# Patient Record
Sex: MALE | Race: WHITE | NOT HISPANIC OR LATINO
[De-identification: names, ages, dates, MRNs, and addresses within clinical notes are randomized per-mention and may not be internally consistent; named-entity substitution may affect disease eponyms.]

---

## 2017-08-15 ENCOUNTER — APPOINTMENT (OUTPATIENT)
Dept: INTERNAL MEDICINE | Facility: CLINIC | Age: 62
End: 2017-08-15
Payer: SELF-PAY

## 2017-08-15 VITALS
TEMPERATURE: 98.1 F | BODY MASS INDEX: 22.9 KG/M2 | RESPIRATION RATE: 14 BRPM | HEIGHT: 68 IN | WEIGHT: 151.13 LBS | DIASTOLIC BLOOD PRESSURE: 82 MMHG | SYSTOLIC BLOOD PRESSURE: 146 MMHG | OXYGEN SATURATION: 99 % | HEART RATE: 65 BPM

## 2017-08-15 PROCEDURE — 99499F: CUSTOM

## 2017-08-15 RX ORDER — TRAMADOL HYDROCHLORIDE 50 MG/1
50 TABLET, COATED ORAL
Qty: 25 | Refills: 0 | Status: DISCONTINUED | COMMUNITY
Start: 2017-05-18

## 2017-08-15 RX ORDER — OXYCODONE AND ACETAMINOPHEN 10; 325 MG/1; MG/1
10-325 TABLET ORAL
Qty: 60 | Refills: 0 | Status: DISCONTINUED | COMMUNITY
Start: 2017-06-30

## 2017-08-15 RX ORDER — OXYCODONE 10 MG/1
10 TABLET ORAL
Qty: 90 | Refills: 0 | Status: DISCONTINUED | COMMUNITY
Start: 2017-06-05

## 2017-08-15 RX ORDER — OXYCODONE AND ACETAMINOPHEN 7.5; 325 MG/1; MG/1
7.5-325 TABLET ORAL
Qty: 40 | Refills: 0 | Status: DISCONTINUED | COMMUNITY
Start: 2017-04-17

## 2017-08-15 RX ORDER — OXYCODONE 5 MG/1
5 TABLET ORAL
Qty: 110 | Refills: 0 | Status: DISCONTINUED | COMMUNITY
Start: 2017-08-08

## 2017-08-15 RX ORDER — SERTRALINE HYDROCHLORIDE 100 MG/1
100 TABLET, FILM COATED ORAL
Qty: 90 | Refills: 0 | Status: DISCONTINUED | COMMUNITY
Start: 2017-03-11

## 2017-08-15 RX ORDER — MUPIROCIN 20 MG/G
2 OINTMENT TOPICAL
Qty: 22 | Refills: 0 | Status: DISCONTINUED | COMMUNITY
Start: 2017-07-20

## 2017-08-15 RX ORDER — ALPRAZOLAM 2 MG/1
2 TABLET ORAL
Qty: 60 | Refills: 0 | Status: ACTIVE | COMMUNITY
Start: 2017-04-08

## 2017-08-15 RX ORDER — METHYLPREDNISOLONE 4 MG/1
4 TABLET ORAL
Qty: 21 | Refills: 0 | Status: DISCONTINUED | COMMUNITY
Start: 2017-04-17

## 2017-08-21 LAB
AMPHET UR-MCNC: NEGATIVE
BARBITURATES UR-MCNC: NEGATIVE
BENZODIAZ UR-MCNC: NEGATIVE
COCAINE METAB.OTHER UR-MCNC: NEGATIVE
CREATININE, URINE: 10 MG/DL
METHADONE UR-MCNC: NEGATIVE
METHAQUALONE UR-MCNC: NEGATIVE
OPIATES UR-MCNC: NEGATIVE
PCP UR-MCNC: NEGATIVE
PROPOXYPH UR QL: NEGATIVE
THC UR QL: NEGATIVE

## 2017-09-12 ENCOUNTER — APPOINTMENT (OUTPATIENT)
Dept: INTERNAL MEDICINE | Facility: CLINIC | Age: 62
End: 2017-09-12
Payer: SELF-PAY

## 2017-09-12 PROCEDURE — 99499D: CUSTOM

## 2017-10-10 ENCOUNTER — APPOINTMENT (OUTPATIENT)
Dept: INTERNAL MEDICINE | Facility: CLINIC | Age: 62
End: 2017-10-10
Payer: SELF-PAY

## 2017-10-10 PROCEDURE — 99499D: CUSTOM

## 2017-11-07 ENCOUNTER — APPOINTMENT (OUTPATIENT)
Dept: INTERNAL MEDICINE | Facility: CLINIC | Age: 62
End: 2017-11-07
Payer: SELF-PAY

## 2017-11-07 PROCEDURE — 99499D: CUSTOM

## 2017-12-05 ENCOUNTER — APPOINTMENT (OUTPATIENT)
Dept: INTERNAL MEDICINE | Facility: CLINIC | Age: 62
End: 2017-12-05
Payer: SELF-PAY

## 2017-12-05 PROCEDURE — 99499D: CUSTOM

## 2018-01-05 ENCOUNTER — APPOINTMENT (OUTPATIENT)
Dept: INTERNAL MEDICINE | Facility: CLINIC | Age: 63
End: 2018-01-05
Payer: SELF-PAY

## 2018-01-05 PROCEDURE — 99499D: CUSTOM

## 2018-02-06 ENCOUNTER — APPOINTMENT (OUTPATIENT)
Dept: INTERNAL MEDICINE | Facility: CLINIC | Age: 63
End: 2018-02-06

## 2018-02-12 ENCOUNTER — APPOINTMENT (OUTPATIENT)
Dept: INTERNAL MEDICINE | Facility: CLINIC | Age: 63
End: 2018-02-12
Payer: SELF-PAY

## 2018-02-12 PROCEDURE — 99499D: CUSTOM

## 2018-03-15 ENCOUNTER — APPOINTMENT (OUTPATIENT)
Dept: INTERNAL MEDICINE | Facility: CLINIC | Age: 63
End: 2018-03-15
Payer: SELF-PAY

## 2018-03-15 PROCEDURE — 99499D: CUSTOM

## 2018-04-12 ENCOUNTER — APPOINTMENT (OUTPATIENT)
Dept: INTERNAL MEDICINE | Facility: CLINIC | Age: 63
End: 2018-04-12
Payer: SELF-PAY

## 2018-04-12 PROCEDURE — 99499D: CUSTOM

## 2018-05-10 ENCOUNTER — APPOINTMENT (OUTPATIENT)
Dept: INTERNAL MEDICINE | Facility: CLINIC | Age: 63
End: 2018-05-10
Payer: SELF-PAY

## 2018-05-10 PROCEDURE — 99499D: CUSTOM

## 2018-06-12 ENCOUNTER — APPOINTMENT (OUTPATIENT)
Dept: INTERNAL MEDICINE | Facility: CLINIC | Age: 63
End: 2018-06-12
Payer: SELF-PAY

## 2018-06-12 PROCEDURE — 99499D: CUSTOM

## 2018-07-19 ENCOUNTER — APPOINTMENT (OUTPATIENT)
Dept: INTERNAL MEDICINE | Facility: CLINIC | Age: 63
End: 2018-07-19
Payer: SELF-PAY

## 2018-07-19 PROCEDURE — 99499D: CUSTOM

## 2018-08-17 ENCOUNTER — APPOINTMENT (OUTPATIENT)
Dept: INTERNAL MEDICINE | Facility: CLINIC | Age: 63
End: 2018-08-17
Payer: SELF-PAY

## 2018-08-17 PROCEDURE — 99499D: CUSTOM

## 2018-09-14 ENCOUNTER — APPOINTMENT (OUTPATIENT)
Dept: INTERNAL MEDICINE | Facility: CLINIC | Age: 63
End: 2018-09-14
Payer: SELF-PAY

## 2018-09-14 DIAGNOSIS — G89.29 DORSALGIA, UNSPECIFIED: ICD-10-CM

## 2018-09-14 DIAGNOSIS — M54.9 DORSALGIA, UNSPECIFIED: ICD-10-CM

## 2018-09-14 PROCEDURE — 99499D: CUSTOM

## 2018-09-14 RX ORDER — LIDOCAINE 5% 700 MG/1
5 PATCH TOPICAL
Qty: 60 | Refills: 3 | Status: ACTIVE | COMMUNITY
Start: 2018-09-14 | End: 1900-01-01

## 2018-10-19 ENCOUNTER — APPOINTMENT (OUTPATIENT)
Dept: INTERNAL MEDICINE | Facility: CLINIC | Age: 63
End: 2018-10-19
Payer: SELF-PAY

## 2018-10-19 PROCEDURE — 99499D: CUSTOM

## 2018-12-15 ENCOUNTER — APPOINTMENT (OUTPATIENT)
Dept: INTERNAL MEDICINE | Facility: CLINIC | Age: 63
End: 2018-12-15
Payer: SELF-PAY

## 2018-12-15 DIAGNOSIS — J06.9 ACUTE UPPER RESPIRATORY INFECTION, UNSPECIFIED: ICD-10-CM

## 2018-12-15 PROCEDURE — 99499D: CUSTOM

## 2018-12-15 RX ORDER — FLUTICASONE PROPIONATE 50 UG/1
50 SPRAY, METERED NASAL DAILY
Qty: 1 | Refills: 1 | Status: ACTIVE | COMMUNITY
Start: 2018-12-15 | End: 1900-01-01

## 2019-01-14 ENCOUNTER — RX RENEWAL (OUTPATIENT)
Age: 64
End: 2019-01-14

## 2019-01-15 ENCOUNTER — RX RENEWAL (OUTPATIENT)
Age: 64
End: 2019-01-15

## 2019-01-18 ENCOUNTER — APPOINTMENT (OUTPATIENT)
Dept: INTERNAL MEDICINE | Facility: CLINIC | Age: 64
End: 2019-01-18

## 2019-02-25 ENCOUNTER — APPOINTMENT (OUTPATIENT)
Dept: INTERNAL MEDICINE | Facility: CLINIC | Age: 64
End: 2019-02-25
Payer: SELF-PAY

## 2019-02-25 PROCEDURE — 99499D: CUSTOM

## 2019-02-25 RX ORDER — PROPRANOLOL HYDROCHLORIDE 10 MG/1
10 TABLET ORAL
Qty: 60 | Refills: 0 | Status: DISCONTINUED | COMMUNITY
Start: 2018-09-22

## 2019-02-25 RX ORDER — AZITHROMYCIN 250 MG/1
250 TABLET, FILM COATED ORAL
Qty: 1 | Refills: 0 | Status: DISCONTINUED | COMMUNITY
Start: 2018-12-15 | End: 2019-02-25

## 2019-03-21 ENCOUNTER — APPOINTMENT (OUTPATIENT)
Dept: INTERNAL MEDICINE | Facility: CLINIC | Age: 64
End: 2019-03-21
Payer: SELF-PAY

## 2019-03-21 PROCEDURE — 99499D: CUSTOM

## 2019-04-20 ENCOUNTER — APPOINTMENT (OUTPATIENT)
Dept: INTERNAL MEDICINE | Facility: CLINIC | Age: 64
End: 2019-04-20
Payer: SELF-PAY

## 2019-04-20 PROCEDURE — 99499D: CUSTOM

## 2019-06-03 ENCOUNTER — APPOINTMENT (OUTPATIENT)
Dept: INTERNAL MEDICINE | Facility: CLINIC | Age: 64
End: 2019-06-03
Payer: SELF-PAY

## 2019-06-03 PROCEDURE — 99499D: CUSTOM

## 2019-06-27 ENCOUNTER — APPOINTMENT (OUTPATIENT)
Dept: INTERNAL MEDICINE | Facility: CLINIC | Age: 64
End: 2019-06-27
Payer: SELF-PAY

## 2019-06-27 PROCEDURE — 99499D: CUSTOM

## 2019-08-01 ENCOUNTER — APPOINTMENT (OUTPATIENT)
Dept: INTERNAL MEDICINE | Facility: CLINIC | Age: 64
End: 2019-08-01
Payer: SELF-PAY

## 2019-08-01 PROCEDURE — 99499D: CUSTOM

## 2019-08-29 ENCOUNTER — APPOINTMENT (OUTPATIENT)
Dept: INTERNAL MEDICINE | Facility: CLINIC | Age: 64
End: 2019-08-29
Payer: SELF-PAY

## 2019-08-29 PROCEDURE — 99499D: CUSTOM

## 2019-09-27 ENCOUNTER — APPOINTMENT (OUTPATIENT)
Dept: INTERNAL MEDICINE | Facility: CLINIC | Age: 64
End: 2019-09-27
Payer: SELF-PAY

## 2019-09-27 PROCEDURE — 99499D: CUSTOM

## 2019-10-25 ENCOUNTER — APPOINTMENT (OUTPATIENT)
Dept: INTERNAL MEDICINE | Facility: CLINIC | Age: 64
End: 2019-10-25
Payer: SELF-PAY

## 2019-10-25 PROCEDURE — 99499D: CUSTOM

## 2019-11-22 ENCOUNTER — APPOINTMENT (OUTPATIENT)
Dept: INTERNAL MEDICINE | Facility: CLINIC | Age: 64
End: 2019-11-22
Payer: SELF-PAY

## 2019-11-22 ENCOUNTER — LABORATORY RESULT (OUTPATIENT)
Age: 64
End: 2019-11-22

## 2019-11-22 PROCEDURE — 99499D: CUSTOM

## 2019-12-03 LAB
AMPHET UR-MCNC: NEGATIVE
BARBITURATES UR-MCNC: NEGATIVE
BENZODIAZ UR-MCNC: NORMAL
COCAINE METAB.OTHER UR-MCNC: NEGATIVE
CREATININE, URINE: 7.8 MG/DL
METHADONE UR-MCNC: NEGATIVE
METHAQUALONE UR-MCNC: NEGATIVE
OPIATES UR-MCNC: NEGATIVE
PCP UR-MCNC: NEGATIVE
PROPOXYPH UR QL: NEGATIVE
THC UR QL: NEGATIVE

## 2020-12-16 PROBLEM — J06.9 ACUTE URI: Status: RESOLVED | Noted: 2018-12-15 | Resolved: 2020-12-16

## 2021-02-26 ENCOUNTER — APPOINTMENT (OUTPATIENT)
Dept: INTERNAL MEDICINE | Facility: CLINIC | Age: 66
End: 2021-02-26
Payer: SELF-PAY

## 2021-02-26 PROCEDURE — 99499D: CUSTOM

## 2021-03-19 ENCOUNTER — APPOINTMENT (OUTPATIENT)
Dept: INTERNAL MEDICINE | Facility: CLINIC | Age: 66
End: 2021-03-19

## 2021-03-26 ENCOUNTER — APPOINTMENT (OUTPATIENT)
Dept: INTERNAL MEDICINE | Facility: CLINIC | Age: 66
End: 2021-03-26
Payer: SELF-PAY

## 2021-03-26 PROCEDURE — 99499D: CUSTOM

## 2021-03-26 RX ORDER — BUPRENORPHINE AND NALOXONE 8; 2 MG/1; MG/1
8-2 TABLET SUBLINGUAL
Qty: 30 | Refills: 0 | Status: DISCONTINUED | COMMUNITY
Start: 2017-08-15 | End: 2021-03-26

## 2021-04-22 ENCOUNTER — APPOINTMENT (OUTPATIENT)
Dept: INTERNAL MEDICINE | Facility: CLINIC | Age: 66
End: 2021-04-22
Payer: SELF-PAY

## 2021-04-22 PROCEDURE — 99499D: CUSTOM

## 2021-05-20 ENCOUNTER — APPOINTMENT (OUTPATIENT)
Dept: INTERNAL MEDICINE | Facility: CLINIC | Age: 66
End: 2021-05-20
Payer: SELF-PAY

## 2021-05-20 PROCEDURE — 99499D: CUSTOM

## 2021-06-17 ENCOUNTER — APPOINTMENT (OUTPATIENT)
Dept: INTERNAL MEDICINE | Facility: CLINIC | Age: 66
End: 2021-06-17
Payer: SELF-PAY

## 2021-06-17 PROCEDURE — 99499D: CUSTOM

## 2021-07-08 ENCOUNTER — RX RENEWAL (OUTPATIENT)
Age: 66
End: 2021-07-08

## 2021-07-15 ENCOUNTER — APPOINTMENT (OUTPATIENT)
Dept: INTERNAL MEDICINE | Facility: CLINIC | Age: 66
End: 2021-07-15
Payer: SELF-PAY

## 2021-07-15 PROCEDURE — 99499D: CUSTOM

## 2021-08-26 ENCOUNTER — APPOINTMENT (OUTPATIENT)
Dept: INTERNAL MEDICINE | Facility: CLINIC | Age: 66
End: 2021-08-26
Payer: SELF-PAY

## 2021-08-26 ENCOUNTER — LABORATORY RESULT (OUTPATIENT)
Age: 66
End: 2021-08-26

## 2021-08-26 PROCEDURE — 99499D: CUSTOM

## 2021-09-02 LAB — SUBOXONE: NORMAL

## 2021-09-11 LAB
AMPHET UR-MCNC: NEGATIVE
BARBITURATES UR-MCNC: NEGATIVE
BENZODIAZ UR-MCNC: NORMAL
COCAINE METAB.OTHER UR-MCNC: NEGATIVE
CREATININE, URINE: 38 MG/DL
METHADONE UR-MCNC: NEGATIVE
METHAQUALONE UR-MCNC: NEGATIVE
OPIATES UR-MCNC: NEGATIVE
PCP UR-MCNC: NEGATIVE
PROPOXYPH UR QL: NEGATIVE
THC UR QL: NEGATIVE

## 2021-09-24 ENCOUNTER — APPOINTMENT (OUTPATIENT)
Dept: INTERNAL MEDICINE | Facility: CLINIC | Age: 66
End: 2021-09-24
Payer: SELF-PAY

## 2021-09-24 PROCEDURE — 99499D: CUSTOM

## 2021-10-02 ENCOUNTER — RX RENEWAL (OUTPATIENT)
Age: 66
End: 2021-10-02

## 2021-10-06 ENCOUNTER — RX RENEWAL (OUTPATIENT)
Age: 66
End: 2021-10-06

## 2021-10-22 ENCOUNTER — APPOINTMENT (OUTPATIENT)
Dept: INTERNAL MEDICINE | Facility: CLINIC | Age: 66
End: 2021-10-22
Payer: SELF-PAY

## 2021-10-22 PROCEDURE — 99499D: CUSTOM

## 2021-11-19 ENCOUNTER — APPOINTMENT (OUTPATIENT)
Dept: INTERNAL MEDICINE | Facility: CLINIC | Age: 66
End: 2021-11-19
Payer: SELF-PAY

## 2021-11-19 PROCEDURE — 99499D: CUSTOM

## 2021-12-16 ENCOUNTER — APPOINTMENT (OUTPATIENT)
Dept: INTERNAL MEDICINE | Facility: CLINIC | Age: 66
End: 2021-12-16
Payer: SELF-PAY

## 2021-12-16 PROCEDURE — 99499D: CUSTOM

## 2022-01-13 ENCOUNTER — APPOINTMENT (OUTPATIENT)
Dept: INTERNAL MEDICINE | Facility: CLINIC | Age: 67
End: 2022-01-13
Payer: SELF-PAY

## 2022-01-13 PROCEDURE — 99499D: CUSTOM

## 2022-02-10 ENCOUNTER — APPOINTMENT (OUTPATIENT)
Dept: INTERNAL MEDICINE | Facility: CLINIC | Age: 67
End: 2022-02-10
Payer: SELF-PAY

## 2022-02-10 PROCEDURE — 99499D: CUSTOM

## 2022-03-11 ENCOUNTER — APPOINTMENT (OUTPATIENT)
Dept: INTERNAL MEDICINE | Facility: CLINIC | Age: 67
End: 2022-03-11
Payer: SELF-PAY

## 2022-03-11 PROCEDURE — 99499D: CUSTOM

## 2022-04-11 ENCOUNTER — APPOINTMENT (OUTPATIENT)
Dept: INTERNAL MEDICINE | Facility: CLINIC | Age: 67
End: 2022-04-11
Payer: SELF-PAY

## 2022-04-11 PROCEDURE — 99499D: CUSTOM

## 2022-05-10 ENCOUNTER — APPOINTMENT (OUTPATIENT)
Dept: INTERNAL MEDICINE | Facility: CLINIC | Age: 67
End: 2022-05-10
Payer: SELF-PAY

## 2022-05-10 PROCEDURE — 99499D: CUSTOM

## 2022-06-07 ENCOUNTER — APPOINTMENT (OUTPATIENT)
Dept: INTERNAL MEDICINE | Facility: CLINIC | Age: 67
End: 2022-06-07
Payer: SELF-PAY

## 2022-06-07 PROCEDURE — 99499D: CUSTOM

## 2022-07-05 ENCOUNTER — APPOINTMENT (OUTPATIENT)
Dept: INTERNAL MEDICINE | Facility: CLINIC | Age: 67
End: 2022-07-05

## 2022-07-05 PROCEDURE — 99499D: CUSTOM

## 2022-07-05 RX ORDER — NALOXONE HYDROCHLORIDE 4 MG/.1ML
4 SPRAY NASAL
Qty: 1 | Refills: 0 | Status: ACTIVE | COMMUNITY
Start: 2022-07-05 | End: 1900-01-01

## 2022-08-02 ENCOUNTER — APPOINTMENT (OUTPATIENT)
Dept: INTERNAL MEDICINE | Facility: CLINIC | Age: 67
End: 2022-08-02

## 2022-08-02 PROCEDURE — 99499D: CUSTOM

## 2022-08-30 ENCOUNTER — APPOINTMENT (OUTPATIENT)
Dept: INTERNAL MEDICINE | Facility: CLINIC | Age: 67
End: 2022-08-30

## 2022-08-30 PROCEDURE — 99499D: CUSTOM

## 2022-09-29 ENCOUNTER — APPOINTMENT (OUTPATIENT)
Dept: INTERNAL MEDICINE | Facility: CLINIC | Age: 67
End: 2022-09-29

## 2022-09-29 PROCEDURE — 99499D: CUSTOM

## 2022-10-27 ENCOUNTER — APPOINTMENT (OUTPATIENT)
Dept: INTERNAL MEDICINE | Facility: CLINIC | Age: 67
End: 2022-10-27

## 2022-10-27 PROCEDURE — 99499D: CUSTOM

## 2022-11-22 ENCOUNTER — APPOINTMENT (OUTPATIENT)
Dept: INTERNAL MEDICINE | Facility: CLINIC | Age: 67
End: 2022-11-22

## 2022-11-22 PROCEDURE — 99499D: CUSTOM

## 2022-12-22 ENCOUNTER — APPOINTMENT (OUTPATIENT)
Dept: INTERNAL MEDICINE | Facility: CLINIC | Age: 67
End: 2022-12-22

## 2022-12-22 PROCEDURE — 99499D: CUSTOM

## 2023-01-20 ENCOUNTER — APPOINTMENT (OUTPATIENT)
Dept: INTERNAL MEDICINE | Facility: CLINIC | Age: 68
End: 2023-01-20
Payer: SELF-PAY

## 2023-01-20 PROCEDURE — 99499D: CUSTOM

## 2023-02-17 ENCOUNTER — APPOINTMENT (OUTPATIENT)
Dept: INTERNAL MEDICINE | Facility: CLINIC | Age: 68
End: 2023-02-17
Payer: SELF-PAY

## 2023-02-17 PROCEDURE — 99499D: CUSTOM

## 2023-03-02 ENCOUNTER — APPOINTMENT (OUTPATIENT)
Dept: SURGERY | Facility: CLINIC | Age: 68
End: 2023-03-02
Payer: MEDICARE

## 2023-03-02 VITALS
WEIGHT: 184 LBS | SYSTOLIC BLOOD PRESSURE: 172 MMHG | DIASTOLIC BLOOD PRESSURE: 79 MMHG | HEIGHT: 69 IN | BODY MASS INDEX: 27.25 KG/M2 | HEART RATE: 66 BPM | TEMPERATURE: 97.5 F

## 2023-03-02 PROCEDURE — 99204 OFFICE O/P NEW MOD 45 MIN: CPT

## 2023-03-16 NOTE — ASSESSMENT
[FreeTextEntry1] : Assessment: This is a 68 yo man with a R inguinal hernia.\par \par Plan: \par - Robot-assisted Laparoscopic Inguinal Hernia Repair, Possible Open\par All risks, benefits, alternatives were explained and the patient expressed full understanding.

## 2023-03-16 NOTE — HISTORY OF PRESENT ILLNESS
[de-identified] : This is a 66 yo man with past history of 12.5 pack year tobacco use and past surgical history of R knee surgery (2013), back surgery (2018) who presents for concerns of hernia. Pt was working on renovations and lifted a cannister of oil 4 days ago, after which he noticed a bulge in his R groin. There was no pain associated and he has never experienced this before. He is able to reduce the bulge. He has needed to strain to have a BM recently. He saw his PCP Dr. Stephenson, 2 days ago, who said this was a hernia. Pt is a retired  of 30 years, and has seen other colleagues experience a similar issue.

## 2023-03-16 NOTE — PHYSICAL EXAM
[Normal Breath Sounds] : Normal breath sounds [Normal Heart Sounds] : normal heart sounds [Abdominal Masses] : Abdominal mass present [No HSM] : no hepatosplenomegaly [No Rash or Lesion] : No rash or lesion [Alert] : alert [Oriented to Person] : oriented to person [Oriented to Place] : oriented to place [Oriented to Time] : oriented to time [Calm] : calm [de-identified] : NAD, comfortable in seated position [de-identified] : Conjunctiva and sclera clear [de-identified] : R inguinal hernia

## 2023-03-17 ENCOUNTER — APPOINTMENT (OUTPATIENT)
Dept: INTERNAL MEDICINE | Facility: CLINIC | Age: 68
End: 2023-03-17
Payer: SELF-PAY

## 2023-03-17 PROCEDURE — 99499D: CUSTOM

## 2023-04-06 ENCOUNTER — APPOINTMENT (OUTPATIENT)
Dept: SURGERY | Facility: CLINIC | Age: 68
End: 2023-04-06
Payer: MEDICARE

## 2023-04-06 VITALS
TEMPERATURE: 98.1 F | SYSTOLIC BLOOD PRESSURE: 165 MMHG | WEIGHT: 184 LBS | BODY MASS INDEX: 27.25 KG/M2 | DIASTOLIC BLOOD PRESSURE: 64 MMHG | HEART RATE: 78 BPM | HEIGHT: 69 IN | OXYGEN SATURATION: 98 %

## 2023-04-06 PROCEDURE — 99213 OFFICE O/P EST LOW 20 MIN: CPT

## 2023-04-06 NOTE — HISTORY OF PRESENT ILLNESS
[de-identified] : The patient came back for reexamination after he felt that the right groin had gotten larger.  On exam, it appears that the hernia on the right groin seem to have gotten slightly bigger.  Upon further questioning he stated that he does not have any evidence of obstructive symptoms.  He was reassured and we will proceed with scheduled hernia repair in May.  He was advised to go to the emergency room and contact the office soon as possible if at any point he started to get signs and symptoms of incarceration/strangulation.

## 2023-04-14 ENCOUNTER — APPOINTMENT (OUTPATIENT)
Dept: INTERNAL MEDICINE | Facility: CLINIC | Age: 68
End: 2023-04-14
Payer: SELF-PAY

## 2023-04-14 PROCEDURE — 99499D: CUSTOM

## 2023-05-03 ENCOUNTER — OUTPATIENT (OUTPATIENT)
Dept: OUTPATIENT SERVICES | Facility: HOSPITAL | Age: 68
LOS: 1 days | Discharge: HOME | End: 2023-05-03
Payer: MEDICARE

## 2023-05-03 VITALS
SYSTOLIC BLOOD PRESSURE: 148 MMHG | RESPIRATION RATE: 17 BRPM | DIASTOLIC BLOOD PRESSURE: 75 MMHG | HEART RATE: 64 BPM | HEIGHT: 69 IN | WEIGHT: 194.45 LBS | OXYGEN SATURATION: 97 % | TEMPERATURE: 98 F

## 2023-05-03 DIAGNOSIS — Z92.241 PERSONAL HISTORY OF SYSTEMIC STEROID THERAPY: Chronic | ICD-10-CM

## 2023-05-03 DIAGNOSIS — K40.90 UNILATERAL INGUINAL HERNIA, WITHOUT OBSTRUCTION OR GANGRENE, NOT SPECIFIED AS RECURRENT: ICD-10-CM

## 2023-05-03 DIAGNOSIS — Z01.818 ENCOUNTER FOR OTHER PREPROCEDURAL EXAMINATION: ICD-10-CM

## 2023-05-03 DIAGNOSIS — F11.20 OPIOID DEPENDENCE, UNCOMPLICATED: ICD-10-CM

## 2023-05-03 DIAGNOSIS — F41.9 ANXIETY DISORDER, UNSPECIFIED: ICD-10-CM

## 2023-05-03 DIAGNOSIS — Z96.659 PRESENCE OF UNSPECIFIED ARTIFICIAL KNEE JOINT: Chronic | ICD-10-CM

## 2023-05-03 DIAGNOSIS — Z98.890 OTHER SPECIFIED POSTPROCEDURAL STATES: Chronic | ICD-10-CM

## 2023-05-03 LAB
ANION GAP SERPL CALC-SCNC: 3 MMOL/L — LOW (ref 5–17)
BASOPHILS # BLD AUTO: 0.11 K/UL — SIGNIFICANT CHANGE UP (ref 0–0.2)
BASOPHILS NFR BLD AUTO: 1.8 % — SIGNIFICANT CHANGE UP (ref 0–2)
BUN SERPL-MCNC: 17 MG/DL — SIGNIFICANT CHANGE UP (ref 7–23)
CALCIUM SERPL-MCNC: 9.6 MG/DL — SIGNIFICANT CHANGE UP (ref 8.5–10.1)
CHLORIDE SERPL-SCNC: 103 MMOL/L — SIGNIFICANT CHANGE UP (ref 96–108)
CO2 SERPL-SCNC: 29 MMOL/L — SIGNIFICANT CHANGE UP (ref 22–31)
CREAT SERPL-MCNC: 0.84 MG/DL — SIGNIFICANT CHANGE UP (ref 0.5–1.3)
EGFR: 97 ML/MIN/1.73M2 — SIGNIFICANT CHANGE UP
EOSINOPHIL # BLD AUTO: 0.28 K/UL — SIGNIFICANT CHANGE UP (ref 0–0.5)
EOSINOPHIL NFR BLD AUTO: 4.6 % — SIGNIFICANT CHANGE UP (ref 0–6)
GLUCOSE SERPL-MCNC: 95 MG/DL — SIGNIFICANT CHANGE UP (ref 70–99)
HCT VFR BLD CALC: 34.5 % — LOW (ref 39–50)
HGB BLD-MCNC: 10.9 G/DL — LOW (ref 13–17)
IMM GRANULOCYTES NFR BLD AUTO: 1 % — HIGH (ref 0–0.9)
LYMPHOCYTES # BLD AUTO: 1.57 K/UL — SIGNIFICANT CHANGE UP (ref 1–3.3)
LYMPHOCYTES # BLD AUTO: 26 % — SIGNIFICANT CHANGE UP (ref 13–44)
MCHC RBC-ENTMCNC: 24.2 PG — LOW (ref 27–34)
MCHC RBC-ENTMCNC: 31.6 G/DL — LOW (ref 32–36)
MCV RBC AUTO: 76.5 FL — LOW (ref 80–100)
MONOCYTES # BLD AUTO: 0.84 K/UL — SIGNIFICANT CHANGE UP (ref 0–0.9)
MONOCYTES NFR BLD AUTO: 13.9 % — SIGNIFICANT CHANGE UP (ref 2–14)
NEUTROPHILS # BLD AUTO: 3.19 K/UL — SIGNIFICANT CHANGE UP (ref 1.8–7.4)
NEUTROPHILS NFR BLD AUTO: 52.7 % — SIGNIFICANT CHANGE UP (ref 43–77)
NRBC # BLD: 0 /100 WBCS — SIGNIFICANT CHANGE UP (ref 0–0)
PLATELET # BLD AUTO: 494 K/UL — HIGH (ref 150–400)
POTASSIUM SERPL-MCNC: 4 MMOL/L — SIGNIFICANT CHANGE UP (ref 3.5–5.3)
POTASSIUM SERPL-SCNC: 4 MMOL/L — SIGNIFICANT CHANGE UP (ref 3.5–5.3)
RBC # BLD: 4.51 M/UL — SIGNIFICANT CHANGE UP (ref 4.2–5.8)
RBC # FLD: 17.4 % — HIGH (ref 10.3–14.5)
SODIUM SERPL-SCNC: 135 MMOL/L — SIGNIFICANT CHANGE UP (ref 135–145)
WBC # BLD: 6.05 K/UL — SIGNIFICANT CHANGE UP (ref 3.8–10.5)
WBC # FLD AUTO: 6.05 K/UL — SIGNIFICANT CHANGE UP (ref 3.8–10.5)

## 2023-05-03 PROCEDURE — 93010 ELECTROCARDIOGRAM REPORT: CPT

## 2023-05-03 NOTE — H&P PST ADULT - PROBLEM SELECTOR PLAN 4
Preop instructions provided including NPO status. Hibiclens wash for infection control. Patient aware to stop NSAID, OTC herbals  for 7-10 days, needs to be accompanied  by adult upon discharge.  Patient verbalized understanding  medical clearance  anesthesiologist to review pst labs, ekg, medical clearances and optimization for surgery

## 2023-05-03 NOTE — H&P PST ADULT - PROBLEM/PLAN-2
Problem: Chronic Obstructive Pulmonary Disease (COPD)  Goal: *Oxygen saturation during activity within specified parameters  Outcome: Progressing Towards Goal  Goal: *Able to remain out of bed as prescribed  Outcome: Progressing Towards Goal  Goal: *Absence of hypoxia  Outcome: Progressing Towards Goal  Goal: *Optimize nutritional status  Outcome: Progressing Towards Goal     Problem: Hypertension  Goal: *Blood pressure within specified parameters  Outcome: Progressing Towards Goal  Goal: *Fluid volume balance  Outcome: Progressing Towards Goal  Goal: *Labs within defined limits  Outcome: Progressing Towards Goal     Problem: Chronic Renal Failure  Goal: *Fluid and electrolytes stabilized  Outcome: Progressing Towards Goal     Problem: Falls - Risk of  Goal: *Absence of Falls  Description: Document Darell Fall Risk and appropriate interventions in the flowsheet.   Outcome: Progressing Towards Goal  Note: Fall Risk Interventions:  Mobility Interventions: Assess mobility with egress test, Bed/chair exit alarm, Communicate number of staff needed for ambulation/transfer         Medication Interventions: Assess postural VS orthostatic hypotension, Evaluate medications/consider consulting pharmacy, Patient to call before getting OOB, Teach patient to arise slowly    Elimination Interventions: Bed/chair exit alarm, Call light in reach, Elevated toilet seat              Problem: Patient Education: Go to Patient Education Activity  Goal: Patient/Family Education  Outcome: Progressing Towards Goal     Problem: Patient Education: Go to Patient Education Activity  Goal: Patient/Family Education  Outcome: Progressing Towards Goal     Problem: Patient Education: Go to Patient Education Activity  Goal: Patient/Family Education  Outcome: Progressing Towards Goal DISPLAY PLAN FREE TEXT

## 2023-05-03 NOTE — H&P PST ADULT - NSICDXPASTSURGICALHX_GEN_ALL_CORE_FT
PAST SURGICAL HISTORY:  Previous back surgery     S/P epidural steroid injection     S/P total knee arthroplasty

## 2023-05-03 NOTE — H&P PST ADULT - HISTORY OF PRESENT ILLNESS
66 yo male , pmh- htn, anxiety , narcotic dependancy on Suboxone c/o right groin pain secondary to right inguinal hernia repair- scheduled  for robotic assist laparoscopic   right inguinal hernia  repair    denies recent travels in the past 30 days. No fever, SOB, cough, flu like symptoms or body rash- covid screen

## 2023-05-03 NOTE — H&P PST ADULT - NSANTHOSAYNRD_GEN_A_CORE
No. NICANOR screening performed.  STOP BANG Legend: 0-2 = LOW Risk; 3-4 = INTERMEDIATE Risk; 5-8 = HIGH Risk

## 2023-05-03 NOTE — H&P PST ADULT - ASSESSMENT
right inguinal hernia   CAPRINI SCORE    AGE RELATED RISK FACTORS                                                       MOBILITY RELATED FACTORS  [ ] Age 41-60 years                                            (1 Point)                  [ ] Bed rest                                                        (1 Point)  [x ] Age: 61-74 years                                           (2 Points)                [ ] Plaster cast                                                   (2 Points)  [ ] Age= 75 years                                              (3 Points)                 [ ] Bed bound for more than 72 hours                   (2 Points)    DISEASE RELATED RISK FACTORS                                               GENDER SPECIFIC FACTORS  [ ] Edema in the lower extremities                       (1 Point)                  [ ] Pregnancy                                                     (1 Point)  [ ] Varicose veins                                               (1 Point)                  [ ] Post-partum < 6 weeks                                   (1 Point)             [x ] BMI > 25 Kg/m2                                            (1 Point)                  [ ] Hormonal therapy  or oral contraception            (1 Point)                 [ ] Sepsis (in the previous month)                        (1 Point)                  [ ] History of pregnancy complications  [ ] Pneumonia or serious lung disease                                               [ ] Unexplained or recurrent                       (1 Point)           (in the previous month)                               (1 Point)  [ ] Abnormal pulmonary function test                     (1 Point)                 SURGERY RELATED RISK FACTORS  [ ] Acute myocardial infarction                              (1 Point)                 [ ]  Section                                            (1 Point)  [ ] Congestive heart failure (in the previous month)  (1 Point)                 [ ] Minor surgery                                                 (1 Point)   [ ] Inflammatory bowel disease                             (1 Point)                 [ ] Arthroscopic surgery                                        (2 Points)  [ ] Central venous access                                    (2 Points)                [x ] General surgery lasting more than 45 minutes   (2 Points)       [ ] Stroke (in the previous month)                          (5 Points)               [ ] Elective arthroplasty                                        (5 Points)                                                                                                                                               HEMATOLOGY RELATED FACTORS                                                 TRAUMA RELATED RISK FACTORS  [ ] Prior episodes of VTE                                     (3 Points)                 [ ] Fracture of the hip, pelvis, or leg                       (5 Points)  [ ] Positive family history for VTE                         (3 Points)                 [ ] Acute spinal cord injury (in the previous month)  (5 Points)  [ ] Prothrombin 36180 A                                      (3 Points)                 [ ] Paralysis  (less than 1 month)                          (5 Points)  [ ] Factor V Leiden                                             (3 Points)                 [ ] Multiple Trauma within 1 month                         (5 Points)  [ ] Lupus anticoagulants                                     (3 Points)                                                           [ ] Anticardiolipin antibodies                                (3 Points)                                                       [ ] High homocysteine in the blood                      (3 Points)                                             [ ] Other congenital or acquired thrombophilia       (3 Points)                                                [ ] Heparin induced thrombocytopenia                  (3 Points)                                          Total Score [     5     ]  Caprini Score 0-2: Low risk, No VTE Prophylaxis required for most patient's, encourage ambulation  Caprini Score 3-6: At Risk, Pharmacologic VTE prophylaxis is indicated for most patients ( in the absence of a contraindication)  Caprini Score Greater than or = 7: High Risk , pharmacologic VTE is indicated for most patients ( in the absence of a contraindication)    Caprini score indicates that the patient is high risk for VTE event ( score 6 or greater). Surgical patient's in this group will benefit from both pharmacologic prophylaxis and intermittent compression devices . Surgical team will determine the balance between VTE  risk and bleeding risk and other clinical considerations

## 2023-05-11 ENCOUNTER — APPOINTMENT (OUTPATIENT)
Dept: INTERNAL MEDICINE | Facility: CLINIC | Age: 68
End: 2023-05-11
Payer: SELF-PAY

## 2023-05-11 PROCEDURE — 99499D: CUSTOM

## 2023-05-16 ENCOUNTER — TRANSCRIPTION ENCOUNTER (OUTPATIENT)
Age: 68
End: 2023-05-16

## 2023-05-17 ENCOUNTER — TRANSCRIPTION ENCOUNTER (OUTPATIENT)
Age: 68
End: 2023-05-17

## 2023-05-17 ENCOUNTER — OUTPATIENT (OUTPATIENT)
Dept: OUTPATIENT SERVICES | Facility: HOSPITAL | Age: 68
LOS: 1 days | Discharge: HOME | End: 2023-05-17
Payer: MEDICARE

## 2023-05-17 ENCOUNTER — APPOINTMENT (OUTPATIENT)
Dept: SURGERY | Facility: HOSPITAL | Age: 68
End: 2023-05-17

## 2023-05-17 VITALS
SYSTOLIC BLOOD PRESSURE: 133 MMHG | OXYGEN SATURATION: 97 % | RESPIRATION RATE: 16 BRPM | DIASTOLIC BLOOD PRESSURE: 79 MMHG | HEART RATE: 60 BPM

## 2023-05-17 VITALS
TEMPERATURE: 98 F | DIASTOLIC BLOOD PRESSURE: 75 MMHG | SYSTOLIC BLOOD PRESSURE: 109 MMHG | OXYGEN SATURATION: 95 % | HEIGHT: 68 IN | WEIGHT: 199.96 LBS | RESPIRATION RATE: 17 BRPM | HEART RATE: 61 BPM

## 2023-05-17 DIAGNOSIS — Z98.890 OTHER SPECIFIED POSTPROCEDURAL STATES: Chronic | ICD-10-CM

## 2023-05-17 DIAGNOSIS — Z96.659 PRESENCE OF UNSPECIFIED ARTIFICIAL KNEE JOINT: Chronic | ICD-10-CM

## 2023-05-17 DIAGNOSIS — Z92.241 PERSONAL HISTORY OF SYSTEMIC STEROID THERAPY: Chronic | ICD-10-CM

## 2023-05-17 PROCEDURE — 49650 LAP ING HERNIA REPAIR INIT: CPT | Mod: AS,RT

## 2023-05-17 PROCEDURE — 49650 LAP ING HERNIA REPAIR INIT: CPT | Mod: RT

## 2023-05-17 PROCEDURE — S2900 ROBOTIC SURGICAL SYSTEM: CPT

## 2023-05-17 DEVICE — MESH HERNIA INGUINAL PROGRIP LAPAROSCOPIC 15 X 10CM RIGHT: Type: IMPLANTABLE DEVICE | Site: RIGHT | Status: FUNCTIONAL

## 2023-05-17 RX ORDER — OXYCODONE HYDROCHLORIDE 5 MG/1
1 TABLET ORAL
Qty: 6 | Refills: 0
Start: 2023-05-17

## 2023-05-17 RX ORDER — BUPRENORPHINE AND NALOXONE 2; .5 MG/1; MG/1
2 TABLET SUBLINGUAL
Refills: 0 | DISCHARGE

## 2023-05-17 RX ORDER — FENTANYL CITRATE 50 UG/ML
25 INJECTION INTRAVENOUS
Refills: 0 | Status: DISCONTINUED | OUTPATIENT
Start: 2023-05-17 | End: 2023-05-17

## 2023-05-17 RX ORDER — SODIUM CHLORIDE 9 MG/ML
1000 INJECTION, SOLUTION INTRAVENOUS
Refills: 0 | Status: DISCONTINUED | OUTPATIENT
Start: 2023-05-17 | End: 2023-05-17

## 2023-05-17 RX ORDER — LISINOPRIL 2.5 MG/1
1 TABLET ORAL
Refills: 0 | DISCHARGE

## 2023-05-17 RX ORDER — SODIUM CHLORIDE 9 MG/ML
3 INJECTION INTRAMUSCULAR; INTRAVENOUS; SUBCUTANEOUS EVERY 8 HOURS
Refills: 0 | Status: DISCONTINUED | OUTPATIENT
Start: 2023-05-17 | End: 2023-05-17

## 2023-05-17 RX ORDER — ALPRAZOLAM 0.25 MG
1 TABLET ORAL
Refills: 0 | DISCHARGE

## 2023-05-17 RX ORDER — ONDANSETRON 8 MG/1
4 TABLET, FILM COATED ORAL ONCE
Refills: 0 | Status: DISCONTINUED | OUTPATIENT
Start: 2023-05-17 | End: 2023-05-17

## 2023-05-17 RX ADMIN — SODIUM CHLORIDE 75 MILLILITER(S): 9 INJECTION, SOLUTION INTRAVENOUS at 10:17

## 2023-05-17 NOTE — ASU DISCHARGE PLAN (ADULT/PEDIATRIC) - NS MD DC FALL RISK RISK
For information on Fall & Injury Prevention, visit: [email or web address] OR  [email or web address] OR  https://www.cdc.gov/steadi/patient.html

## 2023-05-17 NOTE — ASU DISCHARGE PLAN (ADULT/PEDIATRIC) - CARE PROVIDER_API CALL
Zurdo Cohen)  Surgery  [address] [place] [address]  Phone: [phone]  Fax: [phone]  Follow Up Time: 2 weeks

## 2023-05-17 NOTE — ASU PREOP CHECKLIST - HEIGHT IN FEET
5 Glycopyrrolate Counseling:  I discussed with the patient the risks of glycopyrrolate including but not limited to skin rash, drowsiness, dry mouth, difficulty urinating, and blurred vision.

## 2023-05-17 NOTE — BRIEF OPERATIVE NOTE - NSICDXBRIEFPROCEDURE_GEN_ALL_CORE_FT
PROCEDURES:  Robot-assisted repair of right inguinal hernia using da [place] Xi 17-May-2023 09:14:46  Chepe HUFF

## 2023-05-17 NOTE — BRIEF OPERATIVE NOTE - OPERATION/FINDINGS
large right inguinal hernia in the indirect space. Mesh placed in the myopectineal orifice. hernia sac reduced

## 2023-05-17 NOTE — ASU PATIENT PROFILE, ADULT - FALL HARM RISK - RISK INTERVENTIONS
Blepharitis instruction sheet given. Communicate Fall Risk and Risk Factors to all staff, patient, and family/Reinforce activity limits and safety measures with patient and family/Visual Cue: Yellow wristband/Bed in lowest position, wheels locked, appropriate side rails in place/Call bell, personal items and telephone in reach/Instruct patient to call for assistance before getting out of bed or chair/Non-slip footwear when patient is out of bed/[place] to call system/Physically safe environment - no spills, clutter or unnecessary equipment/Purposeful Proactive Rounding/Room/bathroom lighting operational, light cord in reach

## 2023-05-18 RX ORDER — OXYCODONE HYDROCHLORIDE 5 MG/1
1 TABLET ORAL
Qty: 12 | Refills: 0
Start: 2023-05-18 | End: 2023-05-20

## 2023-05-18 RX ORDER — IBUPROFEN 200 MG
1 TABLET ORAL
Qty: 16 | Refills: 0
Start: 2023-05-18 | End: 2023-05-21

## 2023-05-22 DIAGNOSIS — I10 ESSENTIAL (PRIMARY) HYPERTENSION: ICD-10-CM

## 2023-05-22 DIAGNOSIS — F11.20 OPIOID DEPENDENCE, UNCOMPLICATED: ICD-10-CM

## 2023-05-22 DIAGNOSIS — F41.9 ANXIETY DISORDER, UNSPECIFIED: ICD-10-CM

## 2023-05-22 DIAGNOSIS — Z96.659 PRESENCE OF UNSPECIFIED ARTIFICIAL KNEE JOINT: ICD-10-CM

## 2023-05-22 DIAGNOSIS — K40.90 UNILATERAL INGUINAL HERNIA, WITHOUT OBSTRUCTION OR GANGRENE, NOT SPECIFIED AS RECURRENT: ICD-10-CM

## 2023-05-23 PROBLEM — F11.20 OPIOID DEPENDENCE, UNCOMPLICATED: Chronic | Status: ACTIVE | Noted: 2023-05-03

## 2023-05-23 PROBLEM — I10 ESSENTIAL (PRIMARY) HYPERTENSION: Chronic | Status: ACTIVE | Noted: 2023-05-03

## 2023-05-23 PROBLEM — F41.9 ANXIETY DISORDER, UNSPECIFIED: Chronic | Status: ACTIVE | Noted: 2023-05-03

## 2023-05-25 ENCOUNTER — APPOINTMENT (OUTPATIENT)
Dept: SURGERY | Facility: CLINIC | Age: 68
End: 2023-05-25
Payer: MEDICARE

## 2023-05-25 VITALS
SYSTOLIC BLOOD PRESSURE: 147 MMHG | OXYGEN SATURATION: 96 % | HEART RATE: 72 BPM | WEIGHT: 184 LBS | TEMPERATURE: 98.4 F | DIASTOLIC BLOOD PRESSURE: 72 MMHG | BODY MASS INDEX: 27.25 KG/M2 | HEIGHT: 69 IN

## 2023-05-25 DIAGNOSIS — K40.90 UNILATERAL INGUINAL HERNIA, W/OUT OBSTRUCTION OR GANGRENE, NOT SPECIFIED AS RECURRENT: ICD-10-CM

## 2023-05-25 PROCEDURE — 99024 POSTOP FOLLOW-UP VISIT: CPT

## 2023-05-25 NOTE — HISTORY OF PRESENT ILLNESS
[de-identified] : The patient was seen and examined. Pt is s/p robotic assisted right inguinal hernia. On exam, the patient has small cord swelling.  Pt was reassured.  Return to office as needed.

## 2023-06-08 ENCOUNTER — APPOINTMENT (OUTPATIENT)
Dept: INTERNAL MEDICINE | Facility: CLINIC | Age: 68
End: 2023-06-08
Payer: SELF-PAY

## 2023-06-08 PROCEDURE — 99499D: CUSTOM

## 2023-07-06 ENCOUNTER — APPOINTMENT (OUTPATIENT)
Dept: INTERNAL MEDICINE | Facility: CLINIC | Age: 68
End: 2023-07-06
Payer: SELF-PAY

## 2023-07-06 PROCEDURE — 99499D: CUSTOM

## 2023-08-03 ENCOUNTER — APPOINTMENT (OUTPATIENT)
Dept: INTERNAL MEDICINE | Facility: CLINIC | Age: 68
End: 2023-08-03
Payer: SELF-PAY

## 2023-08-03 PROCEDURE — 99499D: CUSTOM

## 2023-08-31 ENCOUNTER — APPOINTMENT (OUTPATIENT)
Dept: INTERNAL MEDICINE | Facility: CLINIC | Age: 68
End: 2023-08-31
Payer: SELF-PAY

## 2023-08-31 PROCEDURE — 99499D: CUSTOM

## 2023-09-28 ENCOUNTER — TRANSCRIPTION ENCOUNTER (OUTPATIENT)
Age: 68
End: 2023-09-28

## 2023-09-28 ENCOUNTER — APPOINTMENT (OUTPATIENT)
Dept: INTERNAL MEDICINE | Facility: CLINIC | Age: 68
End: 2023-09-28
Payer: SELF-PAY

## 2023-09-28 PROCEDURE — 99499D: CUSTOM

## 2023-10-27 ENCOUNTER — APPOINTMENT (OUTPATIENT)
Dept: INTERNAL MEDICINE | Facility: CLINIC | Age: 68
End: 2023-10-27
Payer: SELF-PAY

## 2023-10-27 PROCEDURE — 99499D: CUSTOM

## 2023-11-21 ENCOUNTER — APPOINTMENT (OUTPATIENT)
Dept: INTERNAL MEDICINE | Facility: CLINIC | Age: 68
End: 2023-11-21
Payer: SELF-PAY

## 2023-11-21 PROCEDURE — 99499D: CUSTOM

## 2023-12-21 ENCOUNTER — APPOINTMENT (OUTPATIENT)
Dept: INTERNAL MEDICINE | Facility: CLINIC | Age: 68
End: 2023-12-21
Payer: SELF-PAY

## 2023-12-21 PROCEDURE — 99499D: CUSTOM

## 2024-01-04 NOTE — ASU DISCHARGE PLAN (ADULT/PEDIATRIC) - B. DRINK ALCOHOL, BEER, OR WINE
I called and spoke with patient he voiced that [facility]. I advised patient that I would [facility]. He voiced that he was given samples of Nurtec to use PRN. He voiced that he would like to let  know that this medication is working well from him but that he is out of samples now. He would like to know if [facility]UAN would send in script for Nurtec to the [facility].    Statement Selected

## 2024-01-18 ENCOUNTER — APPOINTMENT (OUTPATIENT)
Dept: INTERNAL MEDICINE | Facility: CLINIC | Age: 69
End: 2024-01-18
Payer: SELF-PAY

## 2024-01-18 PROCEDURE — 99499D: CUSTOM

## 2024-02-15 ENCOUNTER — APPOINTMENT (OUTPATIENT)
Dept: INTERNAL MEDICINE | Facility: CLINIC | Age: 69
End: 2024-02-15
Payer: SELF-PAY

## 2024-02-15 PROCEDURE — 99499D: CUSTOM

## 2024-03-15 ENCOUNTER — APPOINTMENT (OUTPATIENT)
Dept: INTERNAL MEDICINE | Facility: CLINIC | Age: 69
End: 2024-03-15
Payer: SELF-PAY

## 2024-03-15 PROCEDURE — 99499D: CUSTOM

## 2024-04-12 ENCOUNTER — APPOINTMENT (OUTPATIENT)
Dept: INTERNAL MEDICINE | Facility: CLINIC | Age: 69
End: 2024-04-12
Payer: SELF-PAY

## 2024-04-12 PROCEDURE — 99499D: CUSTOM

## 2024-05-10 ENCOUNTER — APPOINTMENT (OUTPATIENT)
Dept: INTERNAL MEDICINE | Facility: CLINIC | Age: 69
End: 2024-05-10
Payer: SELF-PAY

## 2024-05-10 PROCEDURE — 99499D: CUSTOM

## 2024-06-07 ENCOUNTER — APPOINTMENT (OUTPATIENT)
Dept: INTERNAL MEDICINE | Facility: CLINIC | Age: 69
End: 2024-06-07
Payer: SELF-PAY

## 2024-06-07 DIAGNOSIS — F11.20 OPIOID DEPENDENCE, UNCOMPLICATED: ICD-10-CM

## 2024-06-07 PROCEDURE — 99499D: CUSTOM

## 2024-06-07 RX ORDER — BUPRENORPHINE AND NALOXONE 8; 2 MG/1; MG/1
8-2 FILM BUCCAL; SUBLINGUAL
Qty: 60 | Refills: 0 | Status: ACTIVE | COMMUNITY
Start: 2022-10-27 | End: 1900-01-01

## 2024-07-05 ENCOUNTER — APPOINTMENT (OUTPATIENT)
Dept: INTERNAL MEDICINE | Facility: CLINIC | Age: 69
End: 2024-07-05
Payer: SELF-PAY

## 2024-07-05 PROCEDURE — 99499D: CUSTOM

## 2024-08-01 ENCOUNTER — APPOINTMENT (OUTPATIENT)
Dept: INTERNAL MEDICINE | Facility: CLINIC | Age: 69
End: 2024-08-01
Payer: SELF-PAY

## 2024-08-01 PROCEDURE — 99499D: CUSTOM

## 2024-08-30 ENCOUNTER — APPOINTMENT (OUTPATIENT)
Dept: INTERNAL MEDICINE | Facility: CLINIC | Age: 69
End: 2024-08-30
Payer: SELF-PAY

## 2024-08-30 PROCEDURE — 99499D: CUSTOM

## 2024-09-27 ENCOUNTER — APPOINTMENT (OUTPATIENT)
Dept: INTERNAL MEDICINE | Facility: CLINIC | Age: 69
End: 2024-09-27
Payer: SELF-PAY

## 2024-09-27 PROCEDURE — 99499D: CUSTOM

## 2024-10-24 ENCOUNTER — APPOINTMENT (OUTPATIENT)
Dept: INTERNAL MEDICINE | Facility: CLINIC | Age: 69
End: 2024-10-24
Payer: SELF-PAY

## 2024-10-24 PROCEDURE — 99499D: CUSTOM

## 2024-11-19 ENCOUNTER — APPOINTMENT (OUTPATIENT)
Dept: INTERNAL MEDICINE | Facility: CLINIC | Age: 69
End: 2024-11-19
Payer: SELF-PAY

## 2024-11-19 PROCEDURE — 99499D: CUSTOM

## 2024-11-24 ENCOUNTER — INPATIENT (INPATIENT)
Facility: HOSPITAL | Age: 69
LOS: 10 days | Discharge: HOME | End: 2024-12-05
Attending: HOSPITALIST | Admitting: HOSPITALIST
Payer: MEDICARE

## 2024-11-24 VITALS
HEART RATE: 86 BPM | WEIGHT: 197.98 LBS | TEMPERATURE: 99 F | SYSTOLIC BLOOD PRESSURE: 119 MMHG | HEIGHT: 68 IN | DIASTOLIC BLOOD PRESSURE: 76 MMHG | OXYGEN SATURATION: 95 % | RESPIRATION RATE: 17 BRPM

## 2024-11-24 DIAGNOSIS — F42.9 OBSESSIVE-COMPULSIVE DISORDER, UNSPECIFIED: ICD-10-CM

## 2024-11-24 DIAGNOSIS — F11.20 OPIOID DEPENDENCE, UNCOMPLICATED: ICD-10-CM

## 2024-11-24 DIAGNOSIS — F41.9 ANXIETY DISORDER, UNSPECIFIED: ICD-10-CM

## 2024-11-24 DIAGNOSIS — Z98.890 OTHER SPECIFIED POSTPROCEDURAL STATES: Chronic | ICD-10-CM

## 2024-11-24 DIAGNOSIS — Z96.659 PRESENCE OF UNSPECIFIED ARTIFICIAL KNEE JOINT: Chronic | ICD-10-CM

## 2024-11-24 DIAGNOSIS — Z92.241 PERSONAL HISTORY OF SYSTEMIC STEROID THERAPY: Chronic | ICD-10-CM

## 2024-11-24 DIAGNOSIS — M79.89 OTHER SPECIFIED SOFT TISSUE DISORDERS: ICD-10-CM

## 2024-11-24 DIAGNOSIS — F41.0 PANIC DISORDER [EPISODIC PAROXYSMAL ANXIETY]: ICD-10-CM

## 2024-11-24 DIAGNOSIS — D64.9 ANEMIA, UNSPECIFIED: ICD-10-CM

## 2024-11-24 DIAGNOSIS — I10 ESSENTIAL (PRIMARY) HYPERTENSION: ICD-10-CM

## 2024-11-24 DIAGNOSIS — Z29.9 ENCOUNTER FOR PROPHYLACTIC MEASURES, UNSPECIFIED: ICD-10-CM

## 2024-11-24 LAB
ALBUMIN SERPL ELPH-MCNC: 3.9 G/DL — SIGNIFICANT CHANGE UP (ref 3.3–5)
ALP SERPL-CCNC: 97 U/L — SIGNIFICANT CHANGE UP (ref 40–120)
ALT FLD-CCNC: 16 U/L — SIGNIFICANT CHANGE UP (ref 12–78)
ANION GAP SERPL CALC-SCNC: 6 MMOL/L — SIGNIFICANT CHANGE UP (ref 5–17)
ANISOCYTOSIS BLD QL: SIGNIFICANT CHANGE UP
APPEARANCE UR: CLEAR — SIGNIFICANT CHANGE UP
APTT BLD: 31.4 SEC — SIGNIFICANT CHANGE UP (ref 24.5–35.6)
AST SERPL-CCNC: 27 U/L — SIGNIFICANT CHANGE UP (ref 15–37)
BACTERIA # UR AUTO: ABNORMAL /HPF
BASOPHILS # BLD AUTO: 0.36 K/UL — HIGH (ref 0–0.2)
BASOPHILS NFR BLD AUTO: 2.1 % — HIGH (ref 0–2)
BILIRUB SERPL-MCNC: 1.2 MG/DL — SIGNIFICANT CHANGE UP (ref 0.2–1.2)
BILIRUB UR-MCNC: NEGATIVE — SIGNIFICANT CHANGE UP
BUN SERPL-MCNC: 26 MG/DL — HIGH (ref 7–23)
CALCIUM SERPL-MCNC: 9.1 MG/DL — SIGNIFICANT CHANGE UP (ref 8.5–10.1)
CHLORIDE SERPL-SCNC: 99 MMOL/L — SIGNIFICANT CHANGE UP (ref 96–108)
CO2 SERPL-SCNC: 29 MMOL/L — SIGNIFICANT CHANGE UP (ref 22–31)
COLOR SPEC: YELLOW — SIGNIFICANT CHANGE UP
CREAT SERPL-MCNC: 1.2 MG/DL — SIGNIFICANT CHANGE UP (ref 0.5–1.3)
CRP SERPL-MCNC: 47 MG/L — HIGH
DIFF PNL FLD: NEGATIVE — SIGNIFICANT CHANGE UP
EGFR: 66 ML/MIN/1.73M2 — SIGNIFICANT CHANGE UP
EOSINOPHIL # BLD AUTO: 0.2 K/UL — SIGNIFICANT CHANGE UP (ref 0–0.5)
EOSINOPHIL NFR BLD AUTO: 1.2 % — SIGNIFICANT CHANGE UP (ref 0–6)
ERYTHROCYTE [SEDIMENTATION RATE] IN BLOOD: 29 MM/HR — HIGH (ref 0–20)
GLUCOSE SERPL-MCNC: 112 MG/DL — HIGH (ref 70–99)
GLUCOSE UR QL: NEGATIVE MG/DL — SIGNIFICANT CHANGE UP
HCT VFR BLD CALC: 30.6 % — LOW (ref 39–50)
HGB BLD-MCNC: 9.5 G/DL — LOW (ref 13–17)
IMM GRANULOCYTES NFR BLD AUTO: 0.6 % — SIGNIFICANT CHANGE UP (ref 0–0.9)
INR BLD: 1.43 RATIO — HIGH (ref 0.85–1.16)
KETONES UR-MCNC: NEGATIVE MG/DL — SIGNIFICANT CHANGE UP
LACTATE SERPL-SCNC: 1.3 MMOL/L — SIGNIFICANT CHANGE UP (ref 0.7–2)
LEUKOCYTE ESTERASE UR-ACNC: NEGATIVE — SIGNIFICANT CHANGE UP
LYMPHOCYTES # BLD AUTO: 1.12 K/UL — SIGNIFICANT CHANGE UP (ref 1–3.3)
LYMPHOCYTES # BLD AUTO: 6.5 % — LOW (ref 13–44)
MAGNESIUM SERPL-MCNC: 1.8 MG/DL — SIGNIFICANT CHANGE UP (ref 1.6–2.6)
MANUAL SMEAR VERIFICATION: SIGNIFICANT CHANGE UP
MCHC RBC-ENTMCNC: 23.8 PG — LOW (ref 27–34)
MCHC RBC-ENTMCNC: 31 G/DL — LOW (ref 32–36)
MCV RBC AUTO: 76.7 FL — LOW (ref 80–100)
MONOCYTES # BLD AUTO: 1.32 K/UL — HIGH (ref 0–0.9)
MONOCYTES NFR BLD AUTO: 7.7 % — SIGNIFICANT CHANGE UP (ref 2–14)
NEUTROPHILS # BLD AUTO: 14.04 K/UL — HIGH (ref 1.8–7.4)
NEUTROPHILS NFR BLD AUTO: 81.9 % — HIGH (ref 43–77)
NITRITE UR-MCNC: NEGATIVE — SIGNIFICANT CHANGE UP
NRBC # BLD: 0 /100 WBCS — SIGNIFICANT CHANGE UP (ref 0–0)
OVALOCYTES BLD QL SMEAR: SLIGHT — SIGNIFICANT CHANGE UP
PH UR: 6 — SIGNIFICANT CHANGE UP (ref 5–8)
PLAT MORPH BLD: NORMAL — SIGNIFICANT CHANGE UP
PLATELET # BLD AUTO: 385 K/UL — SIGNIFICANT CHANGE UP (ref 150–400)
POIKILOCYTOSIS BLD QL AUTO: SLIGHT — SIGNIFICANT CHANGE UP
POLYCHROMASIA BLD QL SMEAR: SLIGHT — SIGNIFICANT CHANGE UP
POTASSIUM SERPL-MCNC: 4.4 MMOL/L — SIGNIFICANT CHANGE UP (ref 3.5–5.3)
POTASSIUM SERPL-SCNC: 4.4 MMOL/L — SIGNIFICANT CHANGE UP (ref 3.5–5.3)
PROT SERPL-MCNC: 7.5 GM/DL — SIGNIFICANT CHANGE UP (ref 6–8.3)
PROT UR-MCNC: NEGATIVE MG/DL — SIGNIFICANT CHANGE UP
PROTHROM AB SERPL-ACNC: 16.1 SEC — HIGH (ref 9.9–13.4)
RBC # BLD: 3.99 M/UL — LOW (ref 4.2–5.8)
RBC # FLD: 21.6 % — HIGH (ref 10.3–14.5)
RBC BLD AUTO: ABNORMAL
RBC CASTS # UR COMP ASSIST: 0 /HPF — SIGNIFICANT CHANGE UP (ref 0–4)
SODIUM SERPL-SCNC: 134 MMOL/L — LOW (ref 135–145)
SP GR SPEC: 1.01 — SIGNIFICANT CHANGE UP (ref 1–1.03)
UROBILINOGEN FLD QL: 0.2 MG/DL — SIGNIFICANT CHANGE UP (ref 0.2–1)
WBC # BLD: 17.14 K/UL — HIGH (ref 3.8–10.5)
WBC # FLD AUTO: 17.14 K/UL — HIGH (ref 3.8–10.5)
WBC UR QL: 1 /HPF — SIGNIFICANT CHANGE UP (ref 0–5)

## 2024-11-24 PROCEDURE — 99285 EMERGENCY DEPT VISIT HI MDM: CPT

## 2024-11-24 PROCEDURE — 99222 1ST HOSP IP/OBS MODERATE 55: CPT

## 2024-11-24 PROCEDURE — 93970 EXTREMITY STUDY: CPT | Mod: 26

## 2024-11-24 RX ORDER — SERTRALINE HYDROCHLORIDE 100 MG/1
0 TABLET, FILM COATED ORAL
Qty: 0 | Refills: 2 | DISCHARGE

## 2024-11-24 RX ORDER — BUPRENORPHINE AND NALOXONE 8; 2 MG/1; MG/1
1 TABLET SUBLINGUAL ONCE
Refills: 0 | Status: DISCONTINUED | OUTPATIENT
Start: 2024-11-24 | End: 2024-11-24

## 2024-11-24 RX ORDER — MAGNESIUM, ALUMINUM HYDROXIDE 200-225/5
30 SUSPENSION, ORAL (FINAL DOSE FORM) ORAL EVERY 4 HOURS
Refills: 0 | Status: DISCONTINUED | OUTPATIENT
Start: 2024-11-24 | End: 2024-11-26

## 2024-11-24 RX ORDER — PIPERACILLIN SODIUM AND TAZOBACTAM SODIUM 4; .5 G/20ML; G/20ML
3.38 INJECTION, POWDER, LYOPHILIZED, FOR SOLUTION INTRAVENOUS ONCE
Refills: 0 | Status: COMPLETED | OUTPATIENT
Start: 2024-11-24 | End: 2024-11-24

## 2024-11-24 RX ORDER — ALPRAZOLAM 0.5 MG
2 TABLET ORAL
Refills: 0 | Status: DISCONTINUED | OUTPATIENT
Start: 2024-11-25 | End: 2024-12-02

## 2024-11-24 RX ORDER — ALPRAZOLAM 0.5 MG
2 TABLET ORAL
Refills: 0 | Status: DISCONTINUED | OUTPATIENT
Start: 2024-11-24 | End: 2024-11-24

## 2024-11-24 RX ORDER — ACETAMINOPHEN 500MG 500 MG/1
650 TABLET, COATED ORAL EVERY 6 HOURS
Refills: 0 | Status: DISCONTINUED | OUTPATIENT
Start: 2024-11-24 | End: 2024-12-05

## 2024-11-24 RX ORDER — ALPRAZOLAM 0.5 MG
2 TABLET ORAL ONCE
Refills: 0 | Status: DISCONTINUED | OUTPATIENT
Start: 2024-11-24 | End: 2024-11-24

## 2024-11-24 RX ORDER — FUROSEMIDE 40 MG/1
20 TABLET ORAL DAILY
Refills: 0 | Status: DISCONTINUED | OUTPATIENT
Start: 2024-11-25 | End: 2024-11-26

## 2024-11-24 RX ORDER — VANCOMYCIN HCL 900 MCG/MG
1000 POWDER (GRAM) MISCELLANEOUS ONCE
Refills: 0 | Status: COMPLETED | OUTPATIENT
Start: 2024-11-24 | End: 2024-11-24

## 2024-11-24 RX ORDER — LISINOPRIL 20 MG/1
5 TABLET ORAL DAILY
Refills: 0 | Status: DISCONTINUED | OUTPATIENT
Start: 2024-11-24 | End: 2024-11-26

## 2024-11-24 RX ORDER — ZOLPIDEM TARTRATE 10 MG/1
0 TABLET ORAL
Qty: 0 | Refills: 1 | DISCHARGE

## 2024-11-24 RX ORDER — ACETAMINOPHEN, DIPHENHYDRAMINE HCL, PHENYLEPHRINE HCL 325; 25; 5 MG/1; MG/1; MG/1
3 TABLET ORAL AT BEDTIME
Refills: 0 | Status: DISCONTINUED | OUTPATIENT
Start: 2024-11-24 | End: 2024-11-27

## 2024-11-24 RX ORDER — ALPRAZOLAM 0.5 MG
1 TABLET ORAL ONCE
Refills: 0 | Status: DISCONTINUED | OUTPATIENT
Start: 2024-11-24 | End: 2024-11-24

## 2024-11-24 RX ORDER — BUPRENORPHINE AND NALOXONE 8; 2 MG/1; MG/1
1 TABLET SUBLINGUAL
Refills: 0 | Status: DISCONTINUED | OUTPATIENT
Start: 2024-11-24 | End: 2024-12-01

## 2024-11-24 RX ORDER — CEFAZOLIN SODIUM 10 G
1000 VIAL (EA) INJECTION EVERY 8 HOURS
Refills: 0 | Status: DISCONTINUED | OUTPATIENT
Start: 2024-11-24 | End: 2024-11-26

## 2024-11-24 RX ORDER — ACETAMINOPHEN 500MG 500 MG/1
1000 TABLET, COATED ORAL ONCE
Refills: 0 | Status: COMPLETED | OUTPATIENT
Start: 2024-11-24 | End: 2024-11-24

## 2024-11-24 RX ORDER — 0.9 % SODIUM CHLORIDE 0.9 %
1000 INTRAVENOUS SOLUTION INTRAVENOUS ONCE
Refills: 0 | Status: COMPLETED | OUTPATIENT
Start: 2024-11-24 | End: 2024-11-24

## 2024-11-24 RX ADMIN — BUPRENORPHINE AND NALOXONE 1 FILM(S): 8; 2 TABLET SUBLINGUAL at 23:14

## 2024-11-24 RX ADMIN — Medication 100 MILLIGRAM(S): at 23:12

## 2024-11-24 RX ADMIN — Medication 250 MILLIGRAM(S): at 20:03

## 2024-11-24 RX ADMIN — PIPERACILLIN SODIUM AND TAZOBACTAM SODIUM 200 GRAM(S): 4; .5 INJECTION, POWDER, LYOPHILIZED, FOR SOLUTION INTRAVENOUS at 19:34

## 2024-11-24 RX ADMIN — Medication 2 MILLIGRAM(S): at 23:22

## 2024-11-24 RX ADMIN — Medication 2 MILLIGRAM(S): at 18:56

## 2024-11-24 RX ADMIN — PIPERACILLIN SODIUM AND TAZOBACTAM SODIUM 3.38 GRAM(S): 4; .5 INJECTION, POWDER, LYOPHILIZED, FOR SOLUTION INTRAVENOUS at 20:04

## 2024-11-24 RX ADMIN — Medication 1000 MILLILITER(S): at 21:16

## 2024-11-24 RX ADMIN — ACETAMINOPHEN 500MG 400 MILLIGRAM(S): 500 TABLET, COATED ORAL at 15:24

## 2024-11-24 NOTE — H&P ADULT - PROBLEM SELECTOR PLAN 2
- Home med Suboxone (due to oxycodone use from back surgery, expected to finish long suboxone taper in 1 year)  - C/w Suboxone

## 2024-11-24 NOTE — H&P ADULT - PROBLEM SELECTOR PLAN 6
- C/w home lisinopril 5mg - Microcytic anemia  - P/w Hgb 9.5 (it was 10.9 in May 2023)  - Possibly iron deficiency anemia  - No sign of bleeding  - Outpt follow up

## 2024-11-24 NOTE — H&P ADULT - PROBLEM SELECTOR PLAN 3
- Home med Xanax 2mg TID. Patient says he gets very bad panic attacks so he will not taper off of Xanax  - C/w home med

## 2024-11-24 NOTE — H&P ADULT - NSHPPHYSICALEXAM_GEN_ALL_CORE
GENERAL: NAD  HEAD: Atraumatic, Normocephalic  EYES: EOMI. Conjunctiva and sclera clear  NECK: Supple  CHEST/LUNG: Clear to auscultation bilaterally; No wheeze, rhonchi, rales  HEART: Regular rate and rhythm; No murmurs  ABDOMEN: Soft, Nontender, Nondistended; Bowel sounds present  EXTREMITIES: BL pitting LE +2 edema; With tenderness, warmth, and erythema below the knee  NEURO: AAOx3, non-focal  SKIN: Past surgical scar on L knee without any erythema or swelling

## 2024-11-24 NOTE — ED PROVIDER NOTE - CARDIAC RHYTHM
regular Cheek-To-Nose Interpolation Flap Text: A decision was made to reconstruct the defect utilizing an interpolation axial flap and a staged reconstruction.  A telfa template was made of the defect.  This telfa template was then used to outline the Cheek-To-Nose Interpolation flap.  The donor area for the pedicle flap was then injected with anesthesia.  The flap was excised through the skin and subcutaneous tissue down to the layer of the underlying musculature.  The interpolation flap was carefully excised within this deep plane to maintain its blood supply.  The edges of the donor site were undermined.   The donor site was closed in a primary fashion.  The pedicle was then rotated into position and sutured.  Once the tube was sutured into place, adequate blood supply was confirmed with blanching and refill.  The pedicle was then wrapped with xeroform gauze and dressed appropriately with a telfa and gauze bandage to ensure continued blood supply and protect the attached pedicle.

## 2024-11-24 NOTE — H&P ADULT - PROBLEM SELECTOR PLAN 1
- P/w 1 month of BL leg swelling   - BL LE edema with tenderness, warmth, and erythema, but erythema is worse on R  - WBC 17.14 but does not meet SIRS criteria  - Lactate 1.3  - ESR 29 but age adjusted ESR wnl  - CRP 47  - S/p Vanc + Zosyn in the ED  - Will start Cefazolin for now  - F/u MRSA  - F/u cultures  - F/u CT LEs, including L knee where patient had knee replacement - P/w 1 month of BL leg swelling.  - BL cellulitis is very rare, but pt has BL LE edema with tenderness, warmth, and erythema, but symptoms are much worse on R  - WBC 17.14 but does not meet SIRS criteria  - S/p 1L IVF in the ED  - Lactate 1.3  - ESR 29 but age adjusted ESR wnl  - CRP elevated at 47  - Duplex US venous = No DVT of LEs  - S/p Vanc + Zosyn in the ED  - Will start Cefazolin for now  - Will start IV Lasix 20mg daily (home dose oral 20mg)  - F/u MRSA  - F/u cultures  - F/u CT LEs, including L knee where patient had knee replacement

## 2024-11-24 NOTE — ED ADULT NURSE NOTE - CHIEF COMPLAINT QUOTE
b/l leg swelling, redness and pain x more than a month, was prescribed a water pill has been taking for 2.5 weeks with no improvement, swelling is worse, denies sob, denies chest pain

## 2024-11-24 NOTE — ED ADULT NURSE REASSESSMENT NOTE - NS ED NURSE REASSESS COMMENT FT1
Dinner provided and kevin. WIfe at bedside. Pt requesting Xanax 2mg routine dose. Dr. SANCHEZ made aware and order placed at this time.

## 2024-11-24 NOTE — H&P ADULT - ASSESSMENT
Patient is a 69M, with PMHx of L knee replacement, OCD, Panic disorder, Anxiety, Opioid dependence on suboxone (from oxycodone use from back surgery, expected to finish long suboxone taper in 1 year), HTN, who comes in with a sudden onset of bilateral leg swelling that started 3 weeks ago. Admitted for BL leg swelling.

## 2024-11-24 NOTE — H&P ADULT - HISTORY OF PRESENT ILLNESS
Patient is a 69M, with PMHx of L knee replacement, OCD, Panic disorder, Anxiety, Opioid dependence on suboxone (from oxycodone use from back surgery, expected to finish long suboxone taper in 1 year), HTN, who comes in with a sudden onset of bilateral leg swelling that started 3 weeks ago. He woke up one day in the morning and noticed his legs were swollen. He went to his PCP Dr. Dr. Toney and was prescribed Lasix 20mg, which did not help. Patient denies headache, fever, chills, nausea, vomit, chest pain, shortness of breath, cough, lightheadedness, abdominal pain, diarrhea, constipation, dark/bloody stool, dysuria, hematuria, loss of strength, or loss of sensation.

## 2024-11-24 NOTE — ED ADULT NURSE NOTE - OBJECTIVE STATEMENT
b/l leg swelling, redness and pain x more than a month, was prescribed a water pill has been taking for 2.5 weeks with no improvement, swelling is worse, denies sob, denies chest pain  Undressed and waiting to be seen. Wife at bedside

## 2024-11-24 NOTE — ED ADULT NURSE NOTE - NSFALLHARMRISKINTERV_ED_ALL_ED

## 2024-11-24 NOTE — ED PROVIDER NOTE - CONSTITUTIONAL, MLM
Well appearing, awake, alert, oriented to person, place, time/situation and in no apparent distress. Speaking in clear full sentences no nasal flaring no shoulders retractions no diaphoresis, smiling appears very comfortable sitting int the stretcher in a bright light room normal...

## 2024-11-24 NOTE — ED PROVIDER NOTE - PROGRESS NOTE DETAILS
Pt denies headache, dizziness, blurred visions, neck/back pain sob, chest pain nausea, vomiting, abd pain. pt's pcp Dr. Yan was called and left message discussed the case with Dr. Tony and admitted pt

## 2024-11-24 NOTE — H&P ADULT - NSICDXPASTMEDICALHX_GEN_ALL_CORE_FT
PAST MEDICAL HISTORY:  Anxiety     HTN (hypertension)     Narcotic dependence     OCD (obsessive compulsive disorder)     Panic disorder

## 2024-11-24 NOTE — ED PROVIDER NOTE - CLINICAL SUMMARY MEDICAL DECISION MAKING FREE TEXT BOX
69 years old male with wife at bed side c/o one month of bilateral lower legs swelling and pain Pt sts he was seen by his pcp Dr. Yan and started him on Furosamide 20 mg qd but sts not helping. Pt has bilateral swelling nonpitting edema to the bilateral lower legs and ankles hot to touch, Pt has regular s1/s2, breath sounds are good, clear equal bilaterally. abd is soft nontender to palp. Pt appears very comfortable Pt denies recent hx of long traveling, headache, dizziness, blurred visions, light sensitivities, focal/distal weakness or numbness, neck/back/hips pain, cough, sob, chest pain, nausea, vomiting, fever, chills, abd pain, dysuria or irregular bowel movements. 69 years old male with wife at bed side c/o one month of bilateral lower legs swelling and pain Pt sts he was seen by his pcp Dr. Yan and started him on Furosamide 20 mg qd but sts not helping. Pt has bilateral swelling nonpitting edema to the bilateral lower legs and ankles hot to touch, Pt has regular s1/s2, breath sounds are good, clear equal bilaterally. abd is soft nontender to palp. Pt appears very comfortable Pt denies recent hx of long traveling, headache, dizziness, blurred visions, light sensitivities, focal/distal weakness or numbness, neck/back/hips pain, cough, sob, chest pain, nausea, vomiting, fever, chills, abd pain, dysuria or irregular bowel movements.  wbc [id number] us of legs no dvt   progress note

## 2024-11-25 ENCOUNTER — RESULT REVIEW (OUTPATIENT)
Age: 69
End: 2024-11-25

## 2024-11-25 LAB
ALBUMIN SERPL ELPH-MCNC: 3.9 G/DL — SIGNIFICANT CHANGE UP (ref 3.3–5)
ALP SERPL-CCNC: 102 U/L — SIGNIFICANT CHANGE UP (ref 40–120)
ALT FLD-CCNC: 16 U/L — SIGNIFICANT CHANGE UP (ref 12–78)
ANION GAP SERPL CALC-SCNC: 8 MMOL/L — SIGNIFICANT CHANGE UP (ref 5–17)
AST SERPL-CCNC: 27 U/L — SIGNIFICANT CHANGE UP (ref 15–37)
BILIRUB SERPL-MCNC: 1.8 MG/DL — HIGH (ref 0.2–1.2)
BUN SERPL-MCNC: 26 MG/DL — HIGH (ref 7–23)
CALCIUM SERPL-MCNC: 9.2 MG/DL — SIGNIFICANT CHANGE UP (ref 8.5–10.1)
CHLORIDE SERPL-SCNC: 100 MMOL/L — SIGNIFICANT CHANGE UP (ref 96–108)
CO2 SERPL-SCNC: 27 MMOL/L — SIGNIFICANT CHANGE UP (ref 22–31)
CREAT SERPL-MCNC: 1.17 MG/DL — SIGNIFICANT CHANGE UP (ref 0.5–1.3)
EGFR: 68 ML/MIN/1.73M2 — SIGNIFICANT CHANGE UP
GLUCOSE SERPL-MCNC: 104 MG/DL — HIGH (ref 70–99)
HCT VFR BLD CALC: 31 % — LOW (ref 39–50)
HGB BLD-MCNC: 9.7 G/DL — LOW (ref 13–17)
MCHC RBC-ENTMCNC: 23.8 PG — LOW (ref 27–34)
MCHC RBC-ENTMCNC: 31.3 G/DL — LOW (ref 32–36)
MCV RBC AUTO: 76 FL — LOW (ref 80–100)
NRBC # BLD: 0 /100 WBCS — SIGNIFICANT CHANGE UP (ref 0–0)
PLATELET # BLD AUTO: 415 K/UL — HIGH (ref 150–400)
POTASSIUM SERPL-MCNC: 4.4 MMOL/L — SIGNIFICANT CHANGE UP (ref 3.5–5.3)
POTASSIUM SERPL-SCNC: 4.4 MMOL/L — SIGNIFICANT CHANGE UP (ref 3.5–5.3)
PROT SERPL-MCNC: 7.6 GM/DL — SIGNIFICANT CHANGE UP (ref 6–8.3)
RBC # BLD: 4.08 M/UL — LOW (ref 4.2–5.8)
RBC # FLD: 21.8 % — HIGH (ref 10.3–14.5)
SODIUM SERPL-SCNC: 135 MMOL/L — SIGNIFICANT CHANGE UP (ref 135–145)
WBC # BLD: 21.25 K/UL — HIGH (ref 3.8–10.5)
WBC # FLD AUTO: 21.25 K/UL — HIGH (ref 3.8–10.5)

## 2024-11-25 PROCEDURE — 73701 CT LOWER EXTREMITY W/DYE: CPT | Mod: 26,50

## 2024-11-25 PROCEDURE — 93306 TTE W/DOPPLER COMPLETE: CPT | Mod: 26

## 2024-11-25 PROCEDURE — 99222 1ST HOSP IP/OBS MODERATE 55: CPT

## 2024-11-25 PROCEDURE — 99232 SBSQ HOSP IP/OBS MODERATE 35: CPT

## 2024-11-25 RX ORDER — HEPARIN SODIUM,PORCINE 1000/ML
5000 VIAL (ML) INJECTION EVERY 8 HOURS
Refills: 0 | Status: DISCONTINUED | OUTPATIENT
Start: 2024-11-25 | End: 2024-12-05

## 2024-11-25 RX ORDER — ALPRAZOLAM 0.5 MG
2 TABLET ORAL ONCE
Refills: 0 | Status: DISCONTINUED | OUTPATIENT
Start: 2024-11-25 | End: 2024-11-25

## 2024-11-25 RX ORDER — INFLUENZA VIRUS VACCINE 15; 15; 15; 15 UG/.5ML; UG/.5ML; UG/.5ML; UG/.5ML
0.5 SUSPENSION INTRAMUSCULAR ONCE
Refills: 0 | Status: DISCONTINUED | OUTPATIENT
Start: 2024-11-25 | End: 2024-12-05

## 2024-11-25 RX ADMIN — FUROSEMIDE 20 MILLIGRAM(S): 40 TABLET ORAL at 05:40

## 2024-11-25 RX ADMIN — Medication 100 MILLIGRAM(S): at 13:22

## 2024-11-25 RX ADMIN — BUPRENORPHINE AND NALOXONE 1 FILM(S): 8; 2 TABLET SUBLINGUAL at 05:41

## 2024-11-25 RX ADMIN — Medication 100 MILLIGRAM(S): at 23:23

## 2024-11-25 RX ADMIN — Medication 100 MILLIGRAM(S): at 06:22

## 2024-11-25 RX ADMIN — LISINOPRIL 5 MILLIGRAM(S): 20 TABLET ORAL at 05:41

## 2024-11-25 RX ADMIN — ACETAMINOPHEN 500MG 650 MILLIGRAM(S): 500 TABLET, COATED ORAL at 16:34

## 2024-11-25 RX ADMIN — Medication 2 MILLIGRAM(S): at 12:16

## 2024-11-25 RX ADMIN — Medication 2 MILLIGRAM(S): at 19:03

## 2024-11-25 RX ADMIN — BUPRENORPHINE AND NALOXONE 1 FILM(S): 8; 2 TABLET SUBLINGUAL at 18:40

## 2024-11-25 RX ADMIN — Medication 5000 UNIT(S): at 23:23

## 2024-11-25 RX ADMIN — Medication 2 MILLIGRAM(S): at 06:21

## 2024-11-25 NOTE — PROGRESS NOTE ADULT - PROBLEM SELECTOR PLAN 1
- P/w 1 month of BL leg swelling.  - BL cellulitis is very rare, but pt has BL LE edema with tenderness, warmth, and erythema, but symptoms are much worse on R  - Duplex US venous = No DVT of LEs    - Will start IV Lasix 20mg daily (home dose oral 20mg)  - F/u cultures  - CT LE with no mature, drainable or enhancing collection seen  - Appreciate ID recs- c/w Cefazolin

## 2024-11-25 NOTE — CONSULT NOTE ADULT - SUBJECTIVE AND OBJECTIVE BOX
[facility]  INFECTIOUS DISEASES   [address] [place] [address]  Tel: [phone]     Fax: [phone]  ==============================================================================  DO Alejandro Echols MD Sehrish Shahid, MD Alexandra Gutman, NP   ==============================================================================    JUAQUIN JOHNSON  [id number]  Male  69y ([birth year])        Patient is a 69y old  Male who presents with a chief complaint of Bilateral leg swelling (24 Nov 2024 20:25)      HPI:  Patient is a 68 y/o man, with PMHx of L knee replacement, OCD, panic disorder, anxiety, opioid dependence-on Suboxone, and HTN, who comes in with a sudden onset of bilateral leg swelling that started 3 weeks ago. He woke up one day in the morning and noticed his legs were swollen. He went to his PCP Dr. Dr. Toney and was prescribed Lasix 20mg, which did not help. Patient denies headache, fever, chills, nausea, vomit, chest pain, shortness of breath, cough, lightheadedness, abdominal pain, diarrhea, constipation, dark/bloody stool, dysuria, hematuria, loss of strength, or loss of sensation. (24 Nov 2024 20:25)    ID consulted for workup and antibiotic management     PAST MEDICAL & SURGICAL HISTORY:  HTN (hypertension)  Narcotic dependence  Anxiety  Panic disorder  OCD (obsessive compulsive disorder)  S/P total knee arthroplasty  Previous back surgery  S/P epidural steroid injection      Allergies  No Known Allergies      ANTIMICROBIALS:  ceFAZolin   IVPB 1000 every 8 hours      MEDICATIONS  (STANDING):  ceFAZolin   IVPB   100 mL/Hr IV Intermittent (11-25-24 @ 06:22)   100 mL/Hr IV Intermittent (11-24-24 @ 23:12)    piperacillin/tazobactam IVPB...   200 mL/Hr IV Intermittent (11-24-24 @ 19:34)    vancomycin  IVPB.   250 mL/Hr IV Intermittent (11-24-24 @ 20:03)        OTHER MEDS: MEDICATIONS  (STANDING):  acetaminophen     Tablet .. 650 every 6 hours PRN  ALPRAZolam 2 <User Schedule>  aluminum hydroxide/magnesium hydroxide/simethicone Suspension 30 every 4 hours PRN  buprenorphine 8 mG/naloxone 2 mG SL Film 1 <User Schedule>  furosemide   Injectable 20 daily  lisinopril 5 daily  melatonin 3 at bedtime PRN      SOCIAL HISTORY:     Smoking Cigarettes [ ]Active [ ] Former [ ]Denies   ETOH [ ]denies [ ]Former [ ]Current Use denies   Drug Use [ ]Never [ ] Former [ ] Active     FAMILY HISTORY:      REVIEW OF SYSTEMS  [  ] ROS unobtainable because:    [  ] All other systems negative except as noted below:	    Constitutional:  [ ] fever [ ] chills  [ ] weight loss  [ ] weakness  Skin:  [ ] rash [ ] phlebitis	  Eyes: [ ] icterus [ ] pain  [ ] discharge	  ENMT: [ ] sore throat  [ ] thrush [ ] ulcers [ ] exudates  Respiratory: [ ] dyspnea [ ] hemoptysis [ ] cough [ ] sputum	  Cardiovascular:  [ ] chest pain [ ] palpitations [ ] edema	  Gastrointestinal:  [ ] nausea [ ] vomiting [ ] diarrhea [ ] constipation [ ] pain	  Genitourinary:  [ ] dysuria [ ] frequency [ ] hematuria [ ] discharge [ ] flank pain  [ ] incontinence  Musculoskeletal:  [ ] myalgias [ ] arthralgias [ ] arthritis  [ ] back pain  Neurological:  [ ] headache [ ] seizures  [ ] confusion/altered mental status  Psychiatric:  [ ] anxiety [ ] depression	  Hematology/Lymphatics:  [ ] lymphadenopathy  Endocrine:  [ ] adrenal [ ] thyroid  Allergic/Immunologic:	 [ ] transplant [ ] seasonal        PHYSICAL EXAM:  Vital Signs Last 24 Hrs  T(C): 37.4 (25 Nov 2024 08:06), Max: 37.4 (25 Nov 2024 08:06)  T(F): 99.3 (25 Nov 2024 08:06), Max: 99.3 (25 Nov 2024 08:06)  HR: 72 (25 Nov 2024 08:06) (66 - 86)  BP: 111/56 (25 Nov 2024 08:06) (91/53 - 148/78)  BP(mean): --  RR: 17 (25 Nov 2024 08:06) (16 - 20)  SpO2: 93% (25 Nov 2024 08:06) (93% - 98%)    Parameters below as of 25 Nov 2024 08:06  Patient On (Oxygen Delivery Method): room air      Constitutional: non-toxic, no distress  HEAD/EYES: anicteric, no conjunctival injection  ENT:  supple, no thrush  Cardiovascular:   normal S1, S2, no murmur, no edema  Respiratory:  clear BS bilaterally, no wheezes, no rales  GI:  soft, non-tender, normal bowel sounds  :  no corley, no CVA tenderness  Musculoskeletal:  no synovitis, normal ROM  Neurologic: awake and alert, normal strength, no focal findings  Skin:  no rash, no erythema, no phlebitis  Heme/Onc: no lymphadenopathy   Psychiatric:  awake, alert, appropriate mood        WBC  WBC Count: 21.25 K/uL (11-25 @ 05:55)  WBC Count: 17.14 K/uL (11-24 @ 15:15)      Auto Neutrophil %: 81.9 % *H* (11-24-24 @ 15:15)  Auto Neutrophil #: 14.04 K/uL *H* (11-24-24 @ 15:15)    CBC                        9.7    21.25 )-----------( 415      ( 25 Nov 2024 05:55 )             31.0     BMP/CMP  11-25    135  |  100  |  26[H]  ----------------------------<  104[H]  4.4   |  27  |  1.17    Ca    9.2      25 Nov 2024 05:55  Mg     1.8     11-24    TPro  7.6  /  Alb  3.9  /  TBili  1.8[H]  /  DBili  x   /  AST  27  /  ALT  16  /  AlkPhos  102  11-25    Creatinine Trend  Creatinine Trend: 1.17<--, 1.20<--        Lactate, Blood: 1.3 mmol/L (11-24-24 @ 19:15)      MICROBIOLOGY:      RADIOLOGY:    ACC: [id number] EXAM:  US DPLX LWR EXT VEINS COMPL BI   ORDERED BY: PATTI SANCHEZ     PROCEDURE DATE:  11/24/2024          INTERPRETATION:  CLINICAL INFORMATION: Bilateral lower extremity   swelling. Pain. Hot to touch.    COMPARISON: Left lower extremity venous Doppler 11/9/2010    TECHNIQUE: Duplex sonography of the BILATERAL LOWER extremity veins with   color and spectral Doppler, with and without compression.    FINDINGS:    RIGHT:  Normal compressibility of the RIGHT common femoral, femoral andpopliteal   veins.  Doppler examination shows normal spontaneous and phasic flow.  Right calf veins not well seen.    Subcutaneous edema in the right calf.    LEFT:  Normal compressibility of the LEFT common femoral, femoral and popliteal   veins.  Doppler examination shows normal spontaneous and phasic flow.  Left calf veins not well seen. No thrombus in the visualized posterior   tibial vein.    Subcutaneous edema in the left calf.    IMPRESSION:    Calf veins not well seen bilaterally with no thrombus in the visualized   left posterior tibial vein.    No evidence of deep venous thrombosis in the common femoral, femoral, and   popliteal veins bilaterally.    --- End of Report ---      PATRICE PEDROZA MD  This document has been electronically signed. Nov 24 2024  5:13PM      I have personally reviewed the above imaging  [facility]  INFECTIOUS DISEASES   [address] [place] [address]  Tel: [phone]     Fax: [phone]  ==============================================================================  DO Alejandro Echols MD Sehrish Shahid, MD Alexandra Gutman, NP   ==============================================================================    JUAQUIN JOHNSON  MRN-[id number]  Male  69y ([birth year])        Patient is a 69y old  Male who presents with a chief complaint of Bilateral leg swelling (24 Nov 2024 20:25)      HPI:  Patient is a 70 y/o man, with PMHx of L knee replacement, OCD, panic disorder, anxiety, opioid dependence-on Suboxone, and HTN, who comes in with a sudden onset of bilateral leg swelling that started 3 weeks ago. He woke up one day in the morning and noticed his legs were swollen. He went to his PCP Dr. Dr. Toney and was prescribed Lasix 20mg, which did not help. Patient denies headache, fever, chills, nausea, vomit, chest pain, shortness of breath, cough, lightheadedness, abdominal pain, diarrhea, constipation, dark/bloody stool, dysuria, hematuria, loss of strength, or loss of sensation. (24 Nov 2024 20:25)  ID consulted for workup and antibiotic management   Seen and examined at bedside. He is afebrile. Says no fever at home. Does not recall any triggering event, fall or trauma. Has pet cat that scratched his leg recently without any deep puncture wound. No recent antibiotics use. He has not had skin infections before. 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 He is not diabetic. He has no known allergies. Work up notable for leukocytosis, ESR: 29, CRP:47. US doppler without DVT. CT leg with soft tissue swelling and no fluid collection.      PAST MEDICAL & SURGICAL HISTORY:  HTN (hypertension)  Narcotic dependence  Anxiety  Panic disorder  OCD (obsessive compulsive disorder)  S/P total knee arthroplasty  Previous back surgery  S/P epidural steroid injection      Allergies  No Known Allergies      ANTIMICROBIALS:  ceFAZolin   IVPB 1000 every 8 hours      MEDICATIONS  (STANDING):  ceFAZolin   IVPB   100 mL/Hr IV Intermittent (11-25-24 @ 06:22)   100 mL/Hr IV Intermittent (11-24-24 @ 23:12)    piperacillin/tazobactam IVPB...   200 mL/Hr IV Intermittent (11-24-24 @ 19:34)    vancomycin  IVPB.   250 mL/Hr IV Intermittent (11-24-24 @ 20:03)        OTHER MEDS: MEDICATIONS  (STANDING):  acetaminophen     Tablet .. 650 every 6 hours PRN  ALPRAZolam 2 <User Schedule>  aluminum hydroxide/magnesium hydroxide/simethicone Suspension 30 every 4 hours PRN  buprenorphine 8 mG/naloxone 2 mG SL Film 1 <User Schedule>  furosemide   Injectable 20 daily  lisinopril 5 daily  melatonin 3 at bedtime PRN      SOCIAL HISTORY:     Smoking Cigarettes [ ]Active [ ] Former [x]Denies (+Vaping occasionally)   ETOH [x]denies [ ]Former [ ]Current Use denies   Drug Use [x]Never [ ] Former [ ] Active     FAMILY HISTORY:  None reported    REVIEW OF SYSTEMS  As per HPI  [ x ] All other systems negative except as noted below:	    Constitutional:  [ ] fever [ ] chills  [ ] weight loss  [ ] weakness  Skin:  [ ] rash [ ] phlebitis	  Eyes: [ ] icterus [ ] pain  [ ] discharge	  ENMT: [ ] sore throat  [ ] thrush [ ] ulcers [ ] exudates  Respiratory: [ ] dyspnea [ ] hemoptysis [ ] cough [ ] sputum	  Cardiovascular:  [ ] chest pain [ ] palpitations [ ] edema	  Gastrointestinal:  [ ] nausea [ ] vomiting [ ] diarrhea [ ] constipation [ ] pain	  Genitourinary:  [ ] dysuria [ ] frequency [ ] hematuria [ ] discharge [ ] flank pain  [ ] incontinence  Musculoskeletal:  [ ] myalgias [ ] arthralgias [ ] arthritis  [ ] back pain  Neurological:  [ ] headache [ ] seizures  [ ] confusion/altered mental status  Psychiatric:  [ ] anxiety [ ] depression	  Hematology/Lymphatics:  [ ] lymphadenopathy  Endocrine:  [ ] adrenal [ ] thyroid  Allergic/Immunologic:	 [ ] transplant [ ] seasonal        PHYSICAL EXAM:  Vital Signs Last 24 Hrs  T(C): 37.4 (25 Nov 2024 08:06), Max: 37.4 (25 Nov 2024 08:06)  T(F): 99.3 (25 Nov 2024 08:06), Max: 99.3 (25 Nov 2024 08:06)  HR: 72 (25 Nov 2024 08:06) (66 - 86)  BP: 111/56 (25 Nov 2024 08:06) (91/53 - 148/78)  BP(mean): --  RR: 17 (25 Nov 2024 08:06) (16 - 20)  SpO2: 93% (25 Nov 2024 08:06) (93% - 98%)    Parameters below as of 25 Nov 2024 08:06  Patient On (Oxygen Delivery Method): room air      Constitutional: Pleasant man, non-toxic, no distress  HEAD/EYES: anicteric, no conjunctival injection  ENT:  supple, no thrush  Cardiovascular:   normal S1, S2, no murmur, no edema  Respiratory:  clear BS bilaterally, no wheezes, no rales  GI:  soft, non-tender, normal bowel sounds  :  no corley, no CVA tenderness  Musculoskeletal:  Bilateral LE with erythema, warmth and swelling R>L, below knee to ankles  Neurologic: awake and alert, normal strength, no focal findings  Skin:  Bilateral onychomycosis  Heme/Onc: no lymphadenopathy   Psychiatric:  awake, alert, appropriate mood        WBC  WBC Count: 21.25 K/uL (11-25 @ 05:55)  WBC Count: 17.14 K/uL (11-24 @ 15:15)      Auto Neutrophil %: 81.9 % *H* (11-24-24 @ 15:15)  Auto Neutrophil #: 14.04 K/uL *H* (11-24-24 @ 15:15)    CBC                        9.7    21.25 )-----------( 415      ( 25 Nov 2024 05:55 )             31.0     BMP/CMP  11-25    135  |  100  |  26[H]  ----------------------------<  104[H]  4.4   |  27  |  1.17    Ca    9.2      25 Nov 2024 05:55  Mg     1.8     11-24    TPro  7.6  /  Alb  3.9  /  TBili  1.8[H]  /  DBili  x   /  AST  27  /  ALT  16  /  AlkPhos  102  11-25    Creatinine Trend  Creatinine Trend: 1.17<--, 1.20<--    Lactate, Blood: 1.3 mmol/L (11-24-24 @ 19:15)      MICROBIOLOGY: None      RADIOLOGY:    ACC: [id number] EXAM:  US DPLX LWR EXT VEINS COMPL BI   ORDERED BY: PATTI SANCHEZ     PROCEDURE DATE:  11/24/2024          INTERPRETATION:  CLINICAL INFORMATION: Bilateral lower extremity   swelling. Pain. Hot to touch.    COMPARISON: Left lower extremity venous Doppler 11/9/2010    TECHNIQUE: Duplex sonography of the BILATERAL LOWER extremity veins with   color and spectral Doppler, with and without compression.    FINDINGS:    RIGHT:  Normal compressibility of the RIGHT common femoral, femoral andpopliteal   veins.  Doppler examination shows normal spontaneous and phasic flow.  Right calf veins not well seen.    Subcutaneous edema in the right calf.    LEFT:  Normal compressibility of the LEFT common femoral, femoral and popliteal   veins.  Doppler examination shows normal spontaneous and phasic flow.  Left calf veins not well seen. No thrombus in the visualized posterior   tibial vein.    Subcutaneous edema in the left calf.    IMPRESSION:    Calf veins not well seen bilaterally with no thrombus in the visualized   left posterior tibial vein.    No evidence of deep venous thrombosis in the common femoral, femoral, and   popliteal veins bilaterally.    --- End of Report ---      PATRICE PEDROZA MD  This document has been electronically signed. Nov 24 2024  5:13PM    < from: CT Lower Extremity w/ IV Cont, Bilateral (11.25.24 @ 01:29) >    ACC: [id number] EXAM:  CT LWR EXT IC BI   ORDERED BY: MINOO NUNES     PROCEDURE DATE:  11/25/2024          INTERPRETATION:  PROCEDURE INFORMATION:  Exam: CT Right Lower Extremity  Exam date and time: 11/25/2024 1:27 AM  Age: 69 years old  Clinical indication: R/O infection    TECHNIQUE:  Imaging protocol: CT of the bilateral lower extremities with intravenous   contrast was  performed.  Contrast material: IV: OMNIPAQUE 350; Contrast volume: 95 ml; Contrast   route:  IV;    COMPARISON:  US LOWER EXTREMITY VENOUS DUPLEX COMPLETE BILATERAL 11/24/2024 4:02 PM    FINDINGS:  No acute displaced fracture. Tricompartmental osteoarthritis of the right   knee which is moderate to severe at the medial component with joint space   loss, subchondral sclerosis, and osteophyte formation. Status post left   total knee arthroplasty without evidence of acute complication. Trace   bilateral knee joint effusions. The bilateral ankle mortises are intact.   Mild osteoarthritis of the bilateral midfoot.    Marked soft tissue edema seen surrounding the bilateral lower   extremities. No mature, drainable, or enhancing collection is seen at   this time.    IMPRESSION:  Marked soft tissue edema seen surrounding the bilateral lower   extremities. No mature, drainable, or enhancing collection is seen at   this time.    --- End of Report ---        INES FRITZ DO  This document has been electronically signed. Nov 25 2024  9:01AM    < end of copied text >      I have personally reviewed the above imaging

## 2024-11-25 NOTE — PROGRESS NOTE ADULT - PROBLEM SELECTOR PLAN 6
- Microcytic anemia  - P/w Hgb 9.5 (it was 10.9 in May 2023)  - Possibly iron deficiency anemia  - No sign of bleeding  - Outpt follow up

## 2024-11-25 NOTE — PROGRESS NOTE ADULT - SUBJECTIVE AND OBJECTIVE BOX
Patient is a 69y old  Male who presents with a chief complaint of Bilateral leg swelling (25 Nov 2024 10:22)    INTERVAL HPI/OVERNIGHT EVENTS: No acute events overnight. HD stable.     MEDICATIONS  (STANDING):  ALPRAZolam 2 milliGRAM(s) Oral <User Schedule>  buprenorphine 8 mG/naloxone 2 mG SL Film 1 Film(s) SubLingual <User Schedule>  ceFAZolin   IVPB 1000 milliGRAM(s) IV Intermittent every 8 hours  furosemide   Injectable 20 milliGRAM(s) IV Push daily  influenza  Vaccine (HIGH DOSE) 0.5 milliLiter(s) IntraMuscular once  lisinopril 5 milliGRAM(s) Oral daily    MEDICATIONS  (PRN):  acetaminophen     Tablet .. 650 milliGRAM(s) Oral every 6 hours PRN Temp greater or equal to 38C (100.4F), Mild Pain (1 - 3)  aluminum hydroxide/magnesium hydroxide/simethicone Suspension 30 milliLiter(s) Oral every 4 hours PRN Dyspepsia  melatonin 3 milliGRAM(s) Oral at bedtime PRN Insomnia      Allergies    No Known Allergies    Intolerances        REVIEW OF SYSTEMS: all negative with exception of above    Vital Signs Last 24 Hrs  T(C): 37.4 (25 Nov 2024 08:06), Max: 37.4 (25 Nov 2024 08:06)  T(F): 99.3 (25 Nov 2024 08:06), Max: 99.3 (25 Nov 2024 08:06)  HR: 72 (25 Nov 2024 08:06) (66 - 85)  BP: 111/56 (25 Nov 2024 08:06) (91/53 - 148/78)  BP(mean): --  RR: 17 (25 Nov 2024 08:06) (16 - 20)  SpO2: 93% (25 Nov 2024 08:06) (93% - 98%)    Parameters below as of 25 Nov 2024 08:06  Patient On (Oxygen Delivery Method): room air        PHYSICAL EXAM:  GENERAL: NAD  CHEST/LUNG: Clear to auscultation bilaterally; No wheeze, rhonchi, rales  HEART: Regular rate and rhythm; No murmurs  ABDOMEN: Soft, Nontender, Nondistended; Bowel sounds present  EXTREMITIES: BL pitting LE +2 edema; With tenderness, warmth, and erythema below the knee  NEURO: AAOx3, non-focal  SKIN: Past surgical scar on L knee without any erythema or swelling    LABS:                        9.7    21.25 )-----------( 415      ( 25 Nov 2024 05:55 )             31.0     11-25    135  |  100  |  26[H]  ----------------------------<  104[H]  4.4   |  27  |  1.17    Ca    9.2      25 Nov 2024 05:55  Mg     1.8     11-24    TPro  7.6  /  Alb  3.9  /  TBili  1.8[H]  /  DBili  x   /  AST  27  /  ALT  16  /  AlkPhos  102  11-25    PT/INR - ( 24 Nov 2024 15:15 )   PT: 16.1 sec;   INR: 1.43 ratio         PTT - ( 24 Nov 2024 15:15 )  PTT:31.4 sec  Urinalysis Basic - ( 25 Nov 2024 05:55 )    Color: x / Appearance: x / SG: x / pH: x  Gluc: 104 mg/dL / Ketone: x  / Bili: x / Urobili: x   Blood: x / Protein: x / Nitrite: x   Leuk Esterase: x / RBC: x / WBC x   Sq Epi: x / Non Sq Epi: x / Bacteria: x      CAPILLARY BLOOD GLUCOSE          RADIOLOGY & ADDITIONAL TESTS:    Imaging Personally Reviewed:  [ ] YES  [ ] NO  < from: CT Lower Extremity w/ IV Cont, Bilateral (11.25.24 @ 01:29) >    IMPRESSION:  Marked soft tissue edema seen surrounding the bilateral lower   extremities. No mature, drainable, or enhancing collection is seen at   this time.    --- End of Report ---            INES FRITZ DO  This document has been electronically signed. Nov 25 2024  9:01AM    < end of copied text >        Consultant(s) Notes Reviewed:  [ ] YES  [ ] NO    Care Discussed with Consultants/Other Providers [ ] YES  [ ] NO

## 2024-11-25 NOTE — CONSULT NOTE ADULT - ASSESSMENT
Patient is a 70 y/o man, with PMHx of L knee replacement, OCD, panic disorder, anxiety, opioid dependence-on Suboxone, and HTN, who comes in with a sudden onset of bilateral leg swelling that started 3 weeks ago. He woke up one day in the morning and noticed his legs were swollen. He went to his PCP Dr. Dr. Toney and was prescribed Lasix 20mg, which did not help. Patient denies headache, fever, chills, nausea, vomit, chest pain, shortness of breath, cough, lightheadedness, abdominal pain, diarrhea, constipation, dark/bloody stool, dysuria, hematuria, loss of strength, or loss of sensation. (24 Nov 2024 20:25)  ID consulted for workup and antibiotic management   Seen and examined at bedside. He is afebrile. Says no fever at home. Does not recall any triggering event, fall or trauma. Has pet cat that scratched his leg recently without any deep puncture wound. No recent antibiotics use. He has not had skin infections before. 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 He has no known allergies. Work up notable for leukocytosis, ESR: 29, CRP:47. US doppler without DVT. CT leg with soft tissue swelling and no fluid collection.    Impression:  1. Right LE cellulitis  2. Bilateral LE edema  3. Leukocytosis  4. Hx of L knee replacement    Recommendations:  --Cefazolin 2 g IV q8h for now  --Check MRSA nares PCR  --Leg elevation and warm compresses  --Trend temp curve and monitor white counts  --Rest per primary    Discussed with Dr. Librado Cota MD  Attending Physician  Division of Infectious Diseases   Available via Microsoft Teams

## 2024-11-25 NOTE — PATIENT PROFILE ADULT - FALL HARM RISK - HARM RISK INTERVENTIONS
Assistance with ambulation/Assistance OOB with selected safe patient handling equipment/Communicate Risk of Fall with Harm to all staff/Discuss with provider need for PT consult/Monitor gait and stability/Reinforce activity limits and safety measures with patient and family/Review medications for side effects contributing to fall risk/Sit up slowly, dangle for a short time, stand at bedside before walking/Tailored Fall Risk Interventions/Toileting schedule using arm’s reach rule for commode and bathroom/Visual Cue: Yellow wristband and red socks/Bed in lowest position, wheels locked, appropriate side rails in place/Call bell, personal items and telephone in reach/Instruct patient to call for assistance before getting out of bed or chair/Non-slip footwear when patient is out of bed/[place] to call system/Physically safe environment - no spills, clutter or unnecessary equipment/Purposeful Proactive Rounding/Room/bathroom lighting operational, light cord in reach

## 2024-11-26 LAB
ALBUMIN SERPL ELPH-MCNC: 3.2 G/DL — LOW (ref 3.3–5)
ALP SERPL-CCNC: 95 U/L — SIGNIFICANT CHANGE UP (ref 40–120)
ALT FLD-CCNC: 13 U/L — SIGNIFICANT CHANGE UP (ref 12–78)
ANION GAP SERPL CALC-SCNC: 7 MMOL/L — SIGNIFICANT CHANGE UP (ref 5–17)
AST SERPL-CCNC: 29 U/L — SIGNIFICANT CHANGE UP (ref 15–37)
BILIRUB SERPL-MCNC: 1.4 MG/DL — HIGH (ref 0.2–1.2)
BUN SERPL-MCNC: 39 MG/DL — HIGH (ref 7–23)
CALCIUM SERPL-MCNC: 8.8 MG/DL — SIGNIFICANT CHANGE UP (ref 8.5–10.1)
CHLORIDE SERPL-SCNC: 101 MMOL/L — SIGNIFICANT CHANGE UP (ref 96–108)
CO2 SERPL-SCNC: 26 MMOL/L — SIGNIFICANT CHANGE UP (ref 22–31)
CREAT SERPL-MCNC: 1.66 MG/DL — HIGH (ref 0.5–1.3)
EGFR: 45 ML/MIN/1.73M2 — LOW
GLUCOSE SERPL-MCNC: 94 MG/DL — SIGNIFICANT CHANGE UP (ref 70–99)
HCT VFR BLD CALC: 28.6 % — LOW (ref 39–50)
HGB BLD-MCNC: 8.7 G/DL — LOW (ref 13–17)
MCHC RBC-ENTMCNC: 23.5 PG — LOW (ref 27–34)
MCHC RBC-ENTMCNC: 30.4 G/DL — LOW (ref 32–36)
MCV RBC AUTO: 77.3 FL — LOW (ref 80–100)
MRSA PCR RESULT.: SIGNIFICANT CHANGE UP
NRBC # BLD: 0 /100 WBCS — SIGNIFICANT CHANGE UP (ref 0–0)
PLATELET # BLD AUTO: 353 K/UL — SIGNIFICANT CHANGE UP (ref 150–400)
POTASSIUM SERPL-MCNC: 3.8 MMOL/L — SIGNIFICANT CHANGE UP (ref 3.5–5.3)
POTASSIUM SERPL-SCNC: 3.8 MMOL/L — SIGNIFICANT CHANGE UP (ref 3.5–5.3)
PROT SERPL-MCNC: 7.3 GM/DL — SIGNIFICANT CHANGE UP (ref 6–8.3)
RBC # BLD: 3.7 M/UL — LOW (ref 4.2–5.8)
RBC # FLD: 22.3 % — HIGH (ref 10.3–14.5)
S AUREUS DNA NOSE QL NAA+PROBE: DETECTED
SODIUM SERPL-SCNC: 134 MMOL/L — LOW (ref 135–145)
WBC # BLD: 22.16 K/UL — HIGH (ref 3.8–10.5)
WBC # FLD AUTO: 22.16 K/UL — HIGH (ref 3.8–10.5)

## 2024-11-26 PROCEDURE — 99232 SBSQ HOSP IP/OBS MODERATE 35: CPT

## 2024-11-26 RX ORDER — CLINDAMYCIN HYDROCHLORIDE 300 MG/1
600 CAPSULE ORAL ONCE
Refills: 0 | Status: COMPLETED | OUTPATIENT
Start: 2024-11-26 | End: 2024-11-26

## 2024-11-26 RX ORDER — CLINDAMYCIN HYDROCHLORIDE 300 MG/1
CAPSULE ORAL
Refills: 0 | Status: DISCONTINUED | OUTPATIENT
Start: 2024-11-26 | End: 2024-11-28

## 2024-11-26 RX ORDER — SODIUM CHLORIDE 9 MG/ML
1000 INJECTION, SOLUTION INTRAMUSCULAR; INTRAVENOUS; SUBCUTANEOUS
Refills: 0 | Status: DISCONTINUED | OUTPATIENT
Start: 2024-11-26 | End: 2024-11-27

## 2024-11-26 RX ORDER — CLINDAMYCIN HYDROCHLORIDE 300 MG/1
600 CAPSULE ORAL EVERY 8 HOURS
Refills: 0 | Status: DISCONTINUED | OUTPATIENT
Start: 2024-11-26 | End: 2024-11-28

## 2024-11-26 RX ORDER — ACETAMINOPHEN 500MG 500 MG/1
1000 TABLET, COATED ORAL ONCE
Refills: 0 | Status: COMPLETED | OUTPATIENT
Start: 2024-11-26 | End: 2024-11-26

## 2024-11-26 RX ADMIN — FUROSEMIDE 20 MILLIGRAM(S): 40 TABLET ORAL at 06:29

## 2024-11-26 RX ADMIN — CLINDAMYCIN HYDROCHLORIDE 100 MILLIGRAM(S): 300 CAPSULE ORAL at 14:18

## 2024-11-26 RX ADMIN — Medication 5000 UNIT(S): at 21:58

## 2024-11-26 RX ADMIN — BUPRENORPHINE AND NALOXONE 1 FILM(S): 8; 2 TABLET SUBLINGUAL at 18:12

## 2024-11-26 RX ADMIN — Medication 2 MILLIGRAM(S): at 04:19

## 2024-11-26 RX ADMIN — Medication 2 MILLIGRAM(S): at 20:48

## 2024-11-26 RX ADMIN — ACETAMINOPHEN, DIPHENHYDRAMINE HCL, PHENYLEPHRINE HCL 3 MILLIGRAM(S): 325; 25; 5 TABLET ORAL at 02:52

## 2024-11-26 RX ADMIN — Medication 100 MILLIGRAM(S): at 06:29

## 2024-11-26 RX ADMIN — Medication 5000 UNIT(S): at 06:35

## 2024-11-26 RX ADMIN — CLINDAMYCIN HYDROCHLORIDE 100 MILLIGRAM(S): 300 CAPSULE ORAL at 21:58

## 2024-11-26 RX ADMIN — ACETAMINOPHEN 500MG 1000 MILLIGRAM(S): 500 TABLET, COATED ORAL at 18:06

## 2024-11-26 RX ADMIN — BUPRENORPHINE AND NALOXONE 1 FILM(S): 8; 2 TABLET SUBLINGUAL at 06:32

## 2024-11-26 RX ADMIN — SODIUM CHLORIDE 100 MILLILITER(S): 9 INJECTION, SOLUTION INTRAMUSCULAR; INTRAVENOUS; SUBCUTANEOUS at 15:03

## 2024-11-26 RX ADMIN — Medication 5000 UNIT(S): at 14:19

## 2024-11-26 RX ADMIN — ACETAMINOPHEN 500MG 400 MILLIGRAM(S): 500 TABLET, COATED ORAL at 17:06

## 2024-11-26 RX ADMIN — Medication 2 MILLIGRAM(S): at 11:34

## 2024-11-26 NOTE — PHYSICAL THERAPY INITIAL EVALUATION ADULT - GENERAL OBSERVATIONS, REHAB EVAL
Chart (EMR) reviewed. Received sitting at edge of bed, NAD. +heplock. Wife present. Alert. Ox4. Able to follow multistep commands/directions.

## 2024-11-26 NOTE — PHYSICAL THERAPY INITIAL EVALUATION ADULT - ADDITIONAL COMMENTS
Patient lives c wife in a pvt house c 1 threshold entry step, all amenities on the 1st floor. Independent c all ADL's and ambulation without device.

## 2024-11-26 NOTE — PHYSICAL THERAPY INITIAL EVALUATION ADULT - CRITERIA FOR SKILLED THERAPEUTIC INTERVENTIONS
28 M no sig PMH/PSH presenting with 4 days of abdominal pain and acute appendicitis on outpt CT scan    Admit to General Surgery Team 4, Dr. Guzman, regional  Pain/nausea control  NPO/IVF  IV Ceftriaxone/Flagyl  Preop and added on to OR for laparoscopic appendectomy tonight  AM labs  Lovenox for DVT prophylaxis  Discussed w/chief resident and Dr. Guzman
impairments found/functional limitations in following categories/risk reduction/prevention

## 2024-11-26 NOTE — PROGRESS NOTE ADULT - ASSESSMENT
Patient is a 69M, with PMHx of L knee replacement, OCD, Panic disorder, Anxiety, Opioid dependence on suboxone (from oxycodone use from back surgery, expected to finish long suboxone taper in 1 year), HTN, who comes in with a sudden onset of bilateral leg swelling that started 3 weeks ago. Admitted for BL leg swelling. Patient is a 69M, with PMHx of L knee replacement, OCD, Panic disorder, Anxiety, Opioid dependence on suboxone (from oxycodone use from back surgery, expected to finish long suboxone taper in 1 year), HTN, who comes in with a sudden onset of bilateral leg swelling that started 3 weeks ago.     Admitted for BL leg swelling.    On IV Lasix 20 mg bid for leg edema, now with CHRISTINE after CT LE. Will stop Lasix and Zestril as BP low.     Continue Ancef for LE cellulitis.     CBC in AM, trend has increased to 22,000.    Patient is a 69M, with PMHx of L knee replacement, OCD, Panic disorder, Anxiety, Opioid dependence on suboxone (from oxycodone use from back surgery, expected to finish long suboxone taper in 1 year), HTN, who comes in with a sudden onset of bilateral leg swelling that started 3 weeks ago.     Admitted for BL leg swelling.    On IV Lasix 20 mg bid for leg edema, now with CHRISTINE after CT LE. Will stop Lasix and Zestril as BP low.     On Ancef for LE cellulitis, WBC increased to 22,000, MRSA is positive.     Will change to Clinda 600 mg IVPB every 8 hours, CBC and SMA-7 in AM.     Will hydrate for 24 hours for likely contrast nephropathy.     LE well wrapped by wound care.

## 2024-11-26 NOTE — PROGRESS NOTE ADULT - SUBJECTIVE AND OBJECTIVE BOX
JUAQUIN JOHNSON  MRN-[id number]  69y (1955)    Follow Up:  Fever, leukocytosis    Interval History: The pt was seen and examined earlier, not in acute distress, pt is awake and alert, answers simple questions and follows simple commands. Pt continues spiking fevers 102.3 yesterday and today, RA, WBC increased 22.16.  Pt complains of mild abdominal discomfort and having loose bowel movements.     PAST MEDICAL & SURGICAL HISTORY:  HTN (hypertension)      Narcotic dependence      Anxiety      Panic disorder      OCD (obsessive compulsive disorder)      S/P total knee arthroplasty      Previous back surgery      S/P epidural steroid injection          ROS:    [ ] Unobtainable because:  [ x] All other systems negative    Constitutional: no fever, no chills  Head: no trauma  Eyes: no vision changes, no eye pain  ENT:  no sore throat, no rhinorrhea  Cardiovascular:  no chest pain, no palpitation  Respiratory:  no SOB, no cough  GI:  mild abdominal discomfort, no vomiting, + diarrhea  urinary: no dysuria, no hematuria, no flank pain  musculoskeletal:  no joint pain, no joint swelling  skin:  no rash  neurology:  no headache, no seizure, no change in mental status  psych: no anxiety, no depression         Allergies  No Known Allergies        ANTIMICROBIALS:  clindamycin IVPB 600 every 8 hours  clindamycin IVPB        OTHER MEDS:  acetaminophen     Tablet .. 650 milliGRAM(s) Oral every 6 hours PRN  ALPRAZolam 2 milliGRAM(s) Oral <User Schedule>  buprenorphine 8 mG/naloxone 2 mG SL Film 1 Film(s) SubLingual <User Schedule>  heparin   Injectable 5000 Unit(s) SubCutaneous every 8 hours  influenza  Vaccine (HIGH DOSE) 0.5 milliLiter(s) IntraMuscular once  melatonin 3 milliGRAM(s) Oral at bedtime PRN  sodium chloride 0.9%. 1000 milliLiter(s) IV Continuous <Continuous>      Vital Signs Last 24 Hrs  T(C): 39.1 (26 Nov 2024 16:25), Max: 39.1 (26 Nov 2024 16:25)  T(F): 102.3 (26 Nov 2024 16:25), Max: 102.3 (26 Nov 2024 16:25)  HR: 87 (26 Nov 2024 16:25) (65 - 87)  BP: 124/62 (26 Nov 2024 16:25) (94/50 - 124/62)  BP(mean): --  RR: 18 (26 Nov 2024 16:25) (16 - 18)  SpO2: 94% (26 Nov 2024 16:25) (92% - 97%)    Parameters below as of 26 Nov 2024 16:25  Patient On (Oxygen Delivery Method): room air        Physical Exam:  Constitutional: Pleasant man, non-toxic, no distress  HEAD/EYES: anicteric, no conjunctival injection  ENT:  supple, no thrush  Cardiovascular:   normal S1, S2, no murmur, no edema  Respiratory:  clear BS bilaterally, no wheezes, no rales  GI:  soft, mild left sided tenderness, normal bowel sounds, not distended   :  no corley, no CVA tenderness  Musculoskeletal:  Bilateral LE with erythema, warmth and swelling R>L, below knee to ankles  Neurologic: awake and alert, normal strength, no focal findings  Skin:  Bilateral onychomycosis  Heme/Onc: no lymphadenopathy   Psychiatric:  awake, alert, appropriate mood    WBC Count: 22.16 K/uL (11-26 @ 08:03)  WBC Count: 21.25 K/uL (11-25 @ 05:55)  WBC Count: 17.14 K/uL (11-24 @ 15:15)                            8.7    22.16 )-----------( 353      ( 26 Nov 2024 08:03 )             28.6       11-26    134[L]  |  101  |  39[H]  ----------------------------<  94  3.8   |  26  |  1.66[H]    Ca    8.8      26 Nov 2024 08:03    TPro  7.3  /  Alb  3.2[L]  /  TBili  1.4[H]  /  DBili  x   /  AST  29  /  ALT  13  /  AlkPhos  95  11-26      Urinalysis Basic - ( 26 Nov 2024 08:03 )    Color: x / Appearance: x / SG: x / pH: x  Gluc: 94 mg/dL / Ketone: x  / Bili: x / Urobili: x   Blood: x / Protein: x / Nitrite: x   Leuk Esterase: x / RBC: x / WBC x   Sq Epi: x / Non Sq Epi: x / Bacteria: x        Creatinine Trend: 1.66<--, 1.17<--, 1.20<--  Lactate, Blood: 1.3 mmol/L (11-24-24 @ 19:15)      MICROBIOLOGY:  v  .Blood BLOOD  11-24-24   No growth at 24 hours  --  --      .Blood BLOOD  11-24-24   No growth at 24 hours  --  --      C-Reactive Protein: 47 (11-24)      RADIOLOGY:     JUAQUIN JOHNSON  MRN-[id number]  69y (1955)    Follow Up:  Fever, leukocytosis    Interval History: The pt was seen and examined earlier, not in acute distress, pt is awake and alert, answers simple questions and follows simple commands. Pt continues spiking fevers 102.3 yesterday and today, RA, WBC increased 22.16.  Pt complains of mild abdominal discomfort and having loose bowel movements.       ROS:    [ ] Unobtainable because:  [ x] All other systems negative    Constitutional: no fever, no chills  Head: no trauma  Eyes: no vision changes, no eye pain  ENT:  no sore throat, no rhinorrhea  Cardiovascular:  no chest pain, no palpitation  Respiratory:  no SOB, no cough  GI:  mild abdominal discomfort, no vomiting, + diarrhea  urinary: no dysuria, no hematuria, no flank pain  musculoskeletal:  no joint pain, no joint swelling  skin:  no rash  neurology:  no headache, no seizure, no change in mental status  psych: no anxiety, no depression       Allergies  No Known Allergies        ANTIMICROBIALS:  clindamycin IVPB 600 every 8 hours  clindamycin IVPB        OTHER MEDS:  acetaminophen     Tablet .. 650 milliGRAM(s) Oral every 6 hours PRN  ALPRAZolam 2 milliGRAM(s) Oral <User Schedule>  buprenorphine 8 mG/naloxone 2 mG SL Film 1 Film(s) SubLingual <User Schedule>  heparin   Injectable 5000 Unit(s) SubCutaneous every 8 hours  influenza  Vaccine (HIGH DOSE) 0.5 milliLiter(s) IntraMuscular once  melatonin 3 milliGRAM(s) Oral at bedtime PRN  sodium chloride 0.9%. 1000 milliLiter(s) IV Continuous <Continuous>      Vital Signs Last 24 Hrs  T(C): 39.1 (26 Nov 2024 16:25), Max: 39.1 (26 Nov 2024 16:25)  T(F): 102.3 (26 Nov 2024 16:25), Max: 102.3 (26 Nov 2024 16:25)  HR: 87 (26 Nov 2024 16:25) (65 - 87)  BP: 124/62 (26 Nov 2024 16:25) (94/50 - 124/62)  BP(mean): --  RR: 18 (26 Nov 2024 16:25) (16 - 18)  SpO2: 94% (26 Nov 2024 16:25) (92% - 97%)    Parameters below as of 26 Nov 2024 16:25  Patient On (Oxygen Delivery Method): room air        Physical Exam:  Constitutional: Pleasant man, non-toxic, no distress  HEAD/EYES: anicteric, no conjunctival injection  ENT:  supple, no thrush  Cardiovascular:   normal S1, S2, no murmur, no edema  Respiratory:  clear BS bilaterally, no wheezes, no rales  GI:  soft, mild left sided tenderness, normal bowel sounds, not distended   :  no corley, no CVA tenderness  Musculoskeletal:  Bilateral LE with erythema, warmth and swelling R>L, below knee to ankles  Neurologic: awake and alert, normal strength, no focal findings  Skin:  Bilateral onychomycosis  Heme/Onc: no lymphadenopathy   Psychiatric:  awake, alert, appropriate mood    WBC Count: 22.16 K/uL (11-26 @ 08:03)  WBC Count: 21.25 K/uL (11-25 @ 05:55)  WBC Count: 17.14 K/uL (11-24 @ 15:15)                            8.7    22.16 )-----------( 353      ( 26 Nov 2024 08:03 )             28.6       11-26    134[L]  |  101  |  39[H]  ----------------------------<  94  3.8   |  26  |  1.66[H]    Ca    8.8      26 Nov 2024 08:03    TPro  7.3  /  Alb  3.2[L]  /  TBili  1.4[H]  /  DBili  x   /  AST  29  /  ALT  13  /  AlkPhos  95  11-26      Urinalysis Basic - ( 26 Nov 2024 08:03 )    Color: x / Appearance: x / SG: x / pH: x  Gluc: 94 mg/dL / Ketone: x  / Bili: x / Urobili: x   Blood: x / Protein: x / Nitrite: x   Leuk Esterase: x / RBC: x / WBC x   Sq Epi: x / Non Sq Epi: x / Bacteria: x        Creatinine Trend: 1.66<--, 1.17<--, 1.20<--  Lactate, Blood: 1.3 mmol/L (11-24-24 @ 19:15)      MICROBIOLOGY:  v  .Blood BLOOD  11-24-24   No growth at 24 hours  --  --      .Blood BLOOD  11-24-24   No growth at 24 hours  --  --      C-Reactive Protein: 47 (11-24)      RADIOLOGY:

## 2024-11-26 NOTE — PROGRESS NOTE ADULT - ASSESSMENT
Patient is a 68 y/o man, with PMHx of L knee replacement, OCD, panic disorder, anxiety, opioid dependence-on Suboxone, and HTN, who comes in with a sudden onset of bilateral leg swelling that started 3 weeks ago. He woke up one day in the morning and noticed his legs were swollen. He went to his PCP Dr. Dr. Toney and was prescribed Lasix 20mg, which did not help. Patient denies headache, fever, chills, nausea, vomit, chest pain, shortness of breath, cough, lightheadedness, abdominal pain, diarrhea, constipation, dark/bloody stool, dysuria, hematuria, loss of strength, or loss of sensation. (24 Nov 2024 20:25)  ID consulted for workup and antibiotic management   Seen and examined at bedside. He is afebrile. Says no fever at home. Does not recall any triggering event, fall or trauma. Has pet cat that scratched his leg recently without any deep puncture wound. No recent antibiotics use. He has not had skin infections before. 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 He is not diabetic. He has no known allergies. Work up notable for leukocytosis, ESR: 29, CRP:47. US doppler without DVT. CT leg with soft tissue swelling and no fluid collection.    11/26: continues having fevers 102.3 today, RA, WBC increased 22.16, Cr elevated 1.66, BCs NGTD, MRSA PCR negative. Abx were changed by medicine to Clindamycin.     Impression:  1. Right LE cellulitis  2. Bilateral LE edema  3. Leukocytosis  4. Hx of L knee replacement    Recommendations:  --Cefazolin 2 g IV q8h was changed to Clindamycin by medicine team   --Leg elevation and warm compresses  --Trend temp curve and monitor white counts  -- stool count if having diarrhea  -- two sets of BCs are pending to be collected  -- consider checking US venous dopplers study to r/o DVT as a source of fevers   --Rest per primary    Discussed with Dr. Cota  Discussed with Dr. Harmon  Patient is a 68 y/o man, with PMHx of L knee replacement, OCD, panic disorder, anxiety, opioid dependence-on Suboxone, and HTN, who comes in with a sudden onset of bilateral leg swelling that started 3 weeks ago. He woke up one day in the morning and noticed his legs were swollen. He went to his PCP Dr. Dr. Toney and was prescribed Lasix 20mg, which did not help. Patient denies headache, fever, chills, nausea, vomit, chest pain, shortness of breath, cough, lightheadedness, abdominal pain, diarrhea, constipation, dark/bloody stool, dysuria, hematuria, loss of strength, or loss of sensation. (24 Nov 2024 20:25)  ID consulted for workup and antibiotic management   Seen and examined at bedside. He is afebrile. Says no fever at home. Does not recall any triggering event, fall or trauma. Has pet cat that scratched his leg recently without any deep puncture wound. No recent antibiotics use. He has not had skin infections before. 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 He has no known allergies. Work up notable for leukocytosis, ESR: 29, CRP:47. US doppler without DVT. CT leg with soft tissue swelling and no fluid collection.    11/26: continues having fevers 102.3 today, RA, WBC increased 22.16, Cr elevated 1.66, BCs NGTD, MRSA PCR negative. Abx were changed by medicine to Clindamycin.     Impression:  1. Right LE cellulitis  2. Bilateral LE edema  3. Leukocytosis  4. Hx of L knee replacement    Recommendations:  --Cefazolin 2 g IV q8h was changed to Clindamycin by medicine team   --Leg elevation and warm compresses  --Trend temp curve and monitor white counts  --Stool count if having diarrhea  --Two sets of BCs are pending to be collected  --Rest per primary    Discussed with Dr. Cota  Discussed with Dr. Harmon

## 2024-11-26 NOTE — PROGRESS NOTE ADULT - SUBJECTIVE AND OBJECTIVE BOX
INTERVAL HPI/OVERNIGHT EVENTS:  Pt seen and examined at bedside.     Allergies/Intolerance: No Known Allergies      MEDICATIONS  (STANDING):  ALPRAZolam 2 milliGRAM(s) Oral <User Schedule>  buprenorphine 8 mG/naloxone 2 mG SL Film 1 Film(s) SubLingual <User Schedule>  ceFAZolin   IVPB 1000 milliGRAM(s) IV Intermittent every 8 hours  furosemide   Injectable 20 milliGRAM(s) IV Push daily  heparin   Injectable 5000 Unit(s) SubCutaneous every 8 hours  influenza  Vaccine (HIGH DOSE) 0.5 milliLiter(s) IntraMuscular once  lisinopril 5 milliGRAM(s) Oral daily    MEDICATIONS  (PRN):  acetaminophen     Tablet .. 650 milliGRAM(s) Oral every 6 hours PRN Temp greater or equal to 38C (100.4F), Mild Pain (1 - 3)  aluminum hydroxide/magnesium hydroxide/simethicone Suspension 30 milliLiter(s) Oral every 4 hours PRN Dyspepsia  melatonin 3 milliGRAM(s) Oral at bedtime PRN Insomnia        ROS: all systems reviewed and wnl      PHYSICAL EXAMINATION:  Vital Signs Last 24 Hrs  T(C): 36.8 (26 Nov 2024 05:51), Max: 39.1 (25 Nov 2024 16:24)  T(F): 98.3 (26 Nov 2024 05:51), Max: 102.3 (25 Nov 2024 16:24)  HR: 70 (26 Nov 2024 05:51) (65 - 83)  BP: 95/51 (26 Nov 2024 05:51) (94/50 - 113/67)  BP(mean): --  RR: 18 (26 Nov 2024 05:51) (16 - 18)  SpO2: 92% (26 Nov 2024 05:51) (92% - 97%)    Parameters below as of 26 Nov 2024 05:51  Patient On (Oxygen Delivery Method): room air      CAPILLARY BLOOD GLUCOSE          11-25 @ 07:01  -  11-26 @ 07:00  --------------------------------------------------------  IN: 200 mL / OUT: 350 mL / NET: -150 mL        GENERAL:   NECK: supple, No JVD  CHEST/LUNG: clear to auscultation bilaterally; no rales, rhonchi, or wheezing b/l  HEART: normal S1, S2  ABDOMEN: BS+, soft, ND, NT   EXTREMITIES:  pulses palpable; no clubbing, cyanosis, or edema b/l LEs    LABS:                        8.7    22.16 )-----------( 353      ( 26 Nov 2024 08:03 )             28.6     11-26    134[L]  |  101  |  39[H]  ----------------------------<  94  3.8   |  26  |  1.66[H]    Ca    8.8      26 Nov 2024 08:03  Mg     1.8     11-24    TPro  7.3  /  Alb  3.2[L]  /  TBili  1.4[H]  /  DBili  x   /  AST  29  /  ALT  13  /  AlkPhos  95  11-26    PT/INR - ( 24 Nov 2024 15:15 )   PT: 16.1 sec;   INR: 1.43 ratio         PTT - ( 24 Nov 2024 15:15 )  PTT:31.4 sec  Urinalysis Basic - ( 26 Nov 2024 08:03 )    Color: x / Appearance: x / SG: x / pH: x  Gluc: 94 mg/dL / Ketone: x  / Bili: x / Urobili: x   Blood: x / Protein: x / Nitrite: x   Leuk Esterase: x / RBC: x / WBC x   Sq Epi: x / Non Sq Epi: x / Bacteria: x

## 2024-11-27 LAB
ANION GAP SERPL CALC-SCNC: 7 MMOL/L — SIGNIFICANT CHANGE UP (ref 5–17)
BUN SERPL-MCNC: 31 MG/DL — HIGH (ref 7–23)
CALCIUM SERPL-MCNC: 7.9 MG/DL — LOW (ref 8.5–10.1)
CHLORIDE SERPL-SCNC: 104 MMOL/L — SIGNIFICANT CHANGE UP (ref 96–108)
CO2 SERPL-SCNC: 24 MMOL/L — SIGNIFICANT CHANGE UP (ref 22–31)
CREAT SERPL-MCNC: 1.05 MG/DL — SIGNIFICANT CHANGE UP (ref 0.5–1.3)
EGFR: 78 ML/MIN/1.73M2 — SIGNIFICANT CHANGE UP
GLUCOSE SERPL-MCNC: 109 MG/DL — HIGH (ref 70–99)
HCT VFR BLD CALC: 23.6 % — LOW (ref 39–50)
HGB BLD-MCNC: 7.2 G/DL — LOW (ref 13–17)
MCHC RBC-ENTMCNC: 23.4 PG — LOW (ref 27–34)
MCHC RBC-ENTMCNC: 30.5 G/DL — LOW (ref 32–36)
MCV RBC AUTO: 76.6 FL — LOW (ref 80–100)
NRBC # BLD: 0 /100 WBCS — SIGNIFICANT CHANGE UP (ref 0–0)
PLATELET # BLD AUTO: 298 K/UL — SIGNIFICANT CHANGE UP (ref 150–400)
POTASSIUM SERPL-MCNC: 3.6 MMOL/L — SIGNIFICANT CHANGE UP (ref 3.5–5.3)
POTASSIUM SERPL-SCNC: 3.6 MMOL/L — SIGNIFICANT CHANGE UP (ref 3.5–5.3)
RBC # BLD: 3.08 M/UL — LOW (ref 4.2–5.8)
RBC # FLD: 21.8 % — HIGH (ref 10.3–14.5)
SODIUM SERPL-SCNC: 135 MMOL/L — SIGNIFICANT CHANGE UP (ref 135–145)
WBC # BLD: 20.53 K/UL — HIGH (ref 3.8–10.5)
WBC # FLD AUTO: 20.53 K/UL — HIGH (ref 3.8–10.5)

## 2024-11-27 PROCEDURE — 99232 SBSQ HOSP IP/OBS MODERATE 35: CPT

## 2024-11-27 RX ADMIN — Medication 5000 UNIT(S): at 13:08

## 2024-11-27 RX ADMIN — Medication 2 MILLIGRAM(S): at 13:09

## 2024-11-27 RX ADMIN — BUPRENORPHINE AND NALOXONE 1 FILM(S): 8; 2 TABLET SUBLINGUAL at 05:30

## 2024-11-27 RX ADMIN — CLINDAMYCIN HYDROCHLORIDE 100 MILLIGRAM(S): 300 CAPSULE ORAL at 21:04

## 2024-11-27 RX ADMIN — CLINDAMYCIN HYDROCHLORIDE 100 MILLIGRAM(S): 300 CAPSULE ORAL at 05:29

## 2024-11-27 RX ADMIN — CLINDAMYCIN HYDROCHLORIDE 100 MILLIGRAM(S): 300 CAPSULE ORAL at 13:08

## 2024-11-27 RX ADMIN — Medication 2 MILLIGRAM(S): at 21:04

## 2024-11-27 RX ADMIN — BUPRENORPHINE AND NALOXONE 1 FILM(S): 8; 2 TABLET SUBLINGUAL at 18:01

## 2024-11-27 RX ADMIN — Medication 5000 UNIT(S): at 21:04

## 2024-11-27 RX ADMIN — SODIUM CHLORIDE 100 MILLILITER(S): 9 INJECTION, SOLUTION INTRAMUSCULAR; INTRAVENOUS; SUBCUTANEOUS at 03:34

## 2024-11-27 RX ADMIN — ACETAMINOPHEN 500MG 650 MILLIGRAM(S): 500 TABLET, COATED ORAL at 23:33

## 2024-11-27 RX ADMIN — Medication 2 MILLIGRAM(S): at 03:34

## 2024-11-27 RX ADMIN — Medication 5000 UNIT(S): at 05:29

## 2024-11-27 RX ADMIN — ACETAMINOPHEN, DIPHENHYDRAMINE HCL, PHENYLEPHRINE HCL 3 MILLIGRAM(S): 325; 25; 5 TABLET ORAL at 00:59

## 2024-11-27 NOTE — PROGRESS NOTE ADULT - ASSESSMENT
Patient is a 69M, with PMHx of L knee replacement, OCD, Panic disorder, Anxiety, Opioid dependence on suboxone (from oxycodone use from back surgery, expected to finish long suboxone taper in 1 year), HTN, who comes in with a sudden onset of bilateral leg swelling that started 3 weeks ago.     Admitted for BL leg swelling. On IV Lasix 20 mg bid for leg edema, now with CHRISTINE after CT LE. Will stop Lasix and Zestril as BP low.     On Ancef for LE cellulitis, WBC increased to 22,000, MRSA is positive.     Will change to Clinda 600 mg IVPB every 8 hours, CBC and SMA-7 in AM.     Will hydrate for 24 hours for likely contrast nephropathy.     LE well wrapped by wound care.  Changed to Clinda yesterday as WBC has gone up few days in a row.     Patient is a 69M, with PMHx of L knee replacement, OCD, Panic disorder, Anxiety, Opioid dependence on suboxone (from oxycodone use from back surgery, expected to finish long suboxone taper in 1 year), HTN, who comes in with a sudden onset of bilateral leg swelling that started 3 weeks ago.     Admitted for BL leg swelling. On IV Lasix 20 mg bid for leg edema, now with CHRISTINE after CT LE. Will stop Lasix and Zestril as BP low.     On Ancef for LE cellulitis, WBC increased to 22,000, MRSA is positive.     Will change to Clinda 600 mg IVPB every 8 hours, CBC and SMA-7 in AM.     Will hydrate for 24 hours for likely contrast nephropathy.     LE well wrapped by wound care.  Changed to Clinda yesterday as WBC has gone up few days in a row.        Addendum: Spoke to wife today by phone Loni at [phone]. All questions were answered, agrees with current plan.

## 2024-11-27 NOTE — PROGRESS NOTE ADULT - ASSESSMENT
Patient is a 70 y/o man, with PMHx of L knee replacement, OCD, panic disorder, anxiety, opioid dependence-on Suboxone, and HTN, who comes in with a sudden onset of bilateral leg swelling that started 3 weeks ago. He woke up one day in the morning and noticed his legs were swollen. He went to his PCP Dr. Dr. Toney and was prescribed Lasix 20mg, which did not help. Patient denies headache, fever, chills, nausea, vomit, chest pain, shortness of breath, cough, lightheadedness, abdominal pain, diarrhea, constipation, dark/bloody stool, dysuria, hematuria, loss of strength, or loss of sensation. (24 Nov 2024 20:25)  ID consulted for workup and antibiotic management   Seen and examined at bedside. He is afebrile. Says no fever at home. Does not recall any triggering event, fall or trauma. Has pet cat that scratched his leg recently without any deep puncture wound. No recent antibiotics use. He has not had skin infections before. 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 He has no known allergies. Work up notable for leukocytosis, ESR: 29, CRP:47. US doppler without DVT. CT leg with soft tissue swelling and no fluid collection.    11/26: continues having fevers 102.3 today, RA, WBC increased 22.16, Cr elevated 1.66, BCs NGTD, MRSA PCR negative. Abx were changed by medicine to Clindamycin.  11/27: spiked a fever yesterday 102.3, no fevers today, RA, WBC with mild improvement 20.53, Cr normalized, BCs NGTD, two more sets were collected due to yesterday's fever, Clindamycin IV continued for now.      Impression:  1. Right LE cellulitis  2. Bilateral LE edema  3. Leukocytosis  4. Hx of L knee replacement    Recommendations:  --Continue Clindamycin IV for now  -- awaiting for BCs x 2  --Leg elevation and warm compresses  --Trend temp curve and monitor white counts  --Stool count if having diarrhea   --Rest per primary    Discussed with Dr. Cota  Discussed with Dr. Harmon  Patient is a 68 y/o man, with PMHx of L knee replacement, OCD, panic disorder, anxiety, opioid dependence-on Suboxone, and HTN, who comes in with a sudden onset of bilateral leg swelling that started 3 weeks ago. He woke up one day in the morning and noticed his legs were swollen. He went to his PCP Dr. Dr. Toney and was prescribed Lasix 20mg, which did not help. Patient denies headache, fever, chills, nausea, vomit, chest pain, shortness of breath, cough, lightheadedness, abdominal pain, diarrhea, constipation, dark/bloody stool, dysuria, hematuria, loss of strength, or loss of sensation. (24 Nov 2024 20:25)  ID consulted for workup and antibiotic management   Seen and examined at bedside. He is afebrile. Says no fever at home. Does not recall any triggering event, fall or trauma. Has pet cat that scratched his leg recently without any deep puncture wound. No recent antibiotics use. He has not had skin infections before. 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 He has no known allergies. Work up notable for leukocytosis, ESR: 29, CRP:47. US doppler without DVT. CT leg with soft tissue swelling and no fluid collection.    11/26: continues having fevers 102.3 today, RA, WBC increased 22.16, Cr elevated 1.66, BCs NGTD, MRSA PCR negative. Abx were changed by medicine to Clindamycin.  11/27: spiked a fever yesterday 102.3, no fevers today, RA, WBC with mild improvement 20.53, Cr normalized, BCs NGTD, two more sets were collected due to yesterday's fever, Clindamycin IV continued for now.      Impression:  1. Right LE cellulitis  2. Bilateral LE edema  3. Leukocytosis  4. Hx of L knee replacement    Recommendations:  --Continue Clindamycin IV for now  --Awaiting for BCs x 2  --Leg elevation and warm compresses  --Trend temp curve and monitor white counts  --Stool count if having diarrhea   --Rest per primary    Discussed with Dr. Cota  Discussed with Dr. Harmon

## 2024-11-27 NOTE — PROGRESS NOTE ADULT - SUBJECTIVE AND OBJECTIVE BOX
JUAQUIN JOHNSON  MRN-[id number]  69y (1955)    Follow Up:  Fever, cellulitis, leukocytosis     Interval History: The pt was seen and examined earlier, not in acute distress, no new complaints. Pt had a fever yesterday evening 102.3, RA, WBC better 20.53.    PAST MEDICAL & SURGICAL HISTORY:  HTN (hypertension)      Narcotic dependence      Anxiety      Panic disorder      OCD (obsessive compulsive disorder)      S/P total knee arthroplasty      Previous back surgery      S/P epidural steroid injection          ROS:    [ ] Unobtainable because:  [x ] All other systems negative    Constitutional: no fever, no chills  Head: no trauma  Eyes: no vision changes, no eye pain  ENT:  no sore throat, no rhinorrhea  Cardiovascular:  no chest pain, no palpitation  Respiratory:  no SOB, no cough  GI:  no abd pain, no vomiting, no diarrhea  urinary: no dysuria, no hematuria, no flank pain  musculoskeletal:  no joint pain, no joint swelling  skin:  no rash  neurology:  no headache, no seizure, no change in mental status  psych: no anxiety, no depression         Allergies  No Known Allergies        ANTIMICROBIALS:  clindamycin IVPB 600 every 8 hours  clindamycin IVPB        OTHER MEDS:  acetaminophen     Tablet .. 650 milliGRAM(s) Oral every 6 hours PRN  ALPRAZolam 2 milliGRAM(s) Oral <User Schedule>  buprenorphine 8 mG/naloxone 2 mG SL Film 1 Film(s) SubLingual <User Schedule>  heparin   Injectable 5000 Unit(s) SubCutaneous every 8 hours  influenza  Vaccine (HIGH DOSE) 0.5 milliLiter(s) IntraMuscular once  melatonin 3 milliGRAM(s) Oral at bedtime PRN      Vital Signs Last 24 Hrs  T(C): 37.5 (27 Nov 2024 11:47), Max: 39.1 (26 Nov 2024 16:25)  T(F): 99.5 (27 Nov 2024 11:47), Max: 102.3 (26 Nov 2024 16:25)  HR: 93 (27 Nov 2024 11:47) (69 - 93)  BP: 111/62 (27 Nov 2024 11:47) (104/54 - 148/76)  BP(mean): --  RR: 16 (27 Nov 2024 11:47) (16 - 18)  SpO2: 91% (27 Nov 2024 11:47) (91% - 95%)    Parameters below as of 27 Nov 2024 11:47  Patient On (Oxygen Delivery Method): room air        Physical Exam:  Constitutional: Pleasant man, non-toxic, no distress  HEAD/EYES: anicteric, no conjunctival injection  ENT:  supple, no thrush  Cardiovascular:   normal S1, S2, no murmur   Respiratory:  clear BS bilaterally, no wheezes, no rales  GI:  soft, mild left sided tenderness, normal bowel sounds, not distended   :  no corley, no CVA tenderness  Musculoskeletal:  Bilateral LE erythema improved, warmth is better, still with swelling R>L, wrapped b/l LEs with ace bandages  Neurologic: awake and alert, normal strength, no focal findings  Skin:  Bilateral onychomycosis  Heme/Onc: no lymphadenopathy   Psychiatric:  awake, alert, appropriate mood    WBC Count: 20.53 K/uL (11-27 @ 06:20)  WBC Count: 22.16 K/uL (11-26 @ 08:03)  WBC Count: 21.25 K/uL (11-25 @ 05:55)  WBC Count: 17.14 K/uL (11-24 @ 15:15)                            7.2    20.53 )-----------( 298      ( 27 Nov 2024 06:20 )             23.6       11-27    135  |  104  |  31[H]  ----------------------------<  109[H]  3.6   |  24  |  1.05    Ca    7.9[L]      27 Nov 2024 06:20    TPro  7.3  /  Alb  3.2[L]  /  TBili  1.4[H]  /  DBili  x   /  AST  29  /  ALT  13  /  AlkPhos  95  11-26      Urinalysis Basic - ( 27 Nov 2024 06:20 )    Color: x / Appearance: x / SG: x / pH: x  Gluc: 109 mg/dL / Ketone: x  / Bili: x / Urobili: x   Blood: x / Protein: x / Nitrite: x   Leuk Esterase: x / RBC: x / WBC x   Sq Epi: x / Non Sq Epi: x / Bacteria: x        Creatinine Trend: 1.05<--, 1.66<--, 1.17<--, 1.20<--      MICROBIOLOGY:  v  .Blood BLOOD  11-24-24   No growth at 48 Hours  --  --      .Blood BLOOD  11-24-24   No growth at 48 Hours  --  --    C-Reactive Protein: 47 (11-24)      RADIOLOGY:     JUAQUIN JOHNSON  MRN-[id number]  69y (1955)    Follow Up:  Fever, cellulitis, leukocytosis     Interval History: The pt was seen and examined earlier, not in acute distress, no new complaints. Pt had a fever yesterday evening 102.3, RA, WBC better 20.53.      ROS:    [ ] Unobtainable because:  [x ] All other systems negative    Constitutional: no fever, no chills  Head: no trauma  Eyes: no vision changes, no eye pain  ENT:  no sore throat, no rhinorrhea  Cardiovascular:  no chest pain, no palpitation  Respiratory:  no SOB, no cough  GI:  no abd pain, no vomiting, no diarrhea  urinary: no dysuria, no hematuria, no flank pain  musculoskeletal:  no joint pain, no joint swelling  skin:  no rash  neurology:  no headache, no seizure, no change in mental status  psych: no anxiety, no depression         Allergies  No Known Allergies        ANTIMICROBIALS:  clindamycin IVPB 600 every 8 hours  clindamycin IVPB        OTHER MEDS:  acetaminophen     Tablet .. 650 milliGRAM(s) Oral every 6 hours PRN  ALPRAZolam 2 milliGRAM(s) Oral <User Schedule>  buprenorphine 8 mG/naloxone 2 mG SL Film 1 Film(s) SubLingual <User Schedule>  heparin   Injectable 5000 Unit(s) SubCutaneous every 8 hours  influenza  Vaccine (HIGH DOSE) 0.5 milliLiter(s) IntraMuscular once  melatonin 3 milliGRAM(s) Oral at bedtime PRN      Vital Signs Last 24 Hrs  T(C): 37.5 (27 Nov 2024 11:47), Max: 39.1 (26 Nov 2024 16:25)  T(F): 99.5 (27 Nov 2024 11:47), Max: 102.3 (26 Nov 2024 16:25)  HR: 93 (27 Nov 2024 11:47) (69 - 93)  BP: 111/62 (27 Nov 2024 11:47) (104/54 - 148/76)  BP(mean): --  RR: 16 (27 Nov 2024 11:47) (16 - 18)  SpO2: 91% (27 Nov 2024 11:47) (91% - 95%)    Parameters below as of 27 Nov 2024 11:47  Patient On (Oxygen Delivery Method): room air        Physical Exam:  Constitutional: Pleasant man, non-toxic, no distress  HEAD/EYES: anicteric, no conjunctival injection  ENT:  supple, no thrush  Cardiovascular:   normal S1, S2, no murmur   Respiratory:  clear BS bilaterally, no wheezes, no rales  GI:  soft, mild left sided tenderness, normal bowel sounds, not distended   :  no corley, no CVA tenderness  Musculoskeletal:  Bilateral LE erythema improved, warmth is better, still with swelling R>L, wrapped b/l LEs with ace bandages  Neurologic: awake and alert, normal strength, no focal findings  Skin:  Bilateral onychomycosis  Heme/Onc: no lymphadenopathy   Psychiatric:  awake, alert, appropriate mood    WBC Count: 20.53 K/uL (11-27 @ 06:20)  WBC Count: 22.16 K/uL (11-26 @ 08:03)  WBC Count: 21.25 K/uL (11-25 @ 05:55)  WBC Count: 17.14 K/uL (11-24 @ 15:15)                            7.2    20.53 )-----------( 298      ( 27 Nov 2024 06:20 )             23.6       11-27    135  |  104  |  31[H]  ----------------------------<  109[H]  3.6   |  24  |  1.05    Ca    7.9[L]      27 Nov 2024 06:20    TPro  7.3  /  Alb  3.2[L]  /  TBili  1.4[H]  /  DBili  x   /  AST  29  /  ALT  13  /  AlkPhos  95  11-26      Urinalysis Basic - ( 27 Nov 2024 06:20 )    Color: x / Appearance: x / SG: x / pH: x  Gluc: 109 mg/dL / Ketone: x  / Bili: x / Urobili: x   Blood: x / Protein: x / Nitrite: x   Leuk Esterase: x / RBC: x / WBC x   Sq Epi: x / Non Sq Epi: x / Bacteria: x        Creatinine Trend: 1.05<--, 1.66<--, 1.17<--, 1.20<--      MICROBIOLOGY:  v  .Blood BLOOD  11-24-24   No growth at 48 Hours  --  --      .Blood BLOOD  11-24-24   No growth at 48 Hours  --  --    C-Reactive Protein: 47 (11-24)      RADIOLOGY:

## 2024-11-27 NOTE — PROGRESS NOTE ADULT - SUBJECTIVE AND OBJECTIVE BOX
INTERVAL HPI/OVERNIGHT EVENTS:  Pt seen and examined at bedside.     Allergies/Intolerance: No Known Allergies      MEDICATIONS  (STANDING):  ALPRAZolam 2 milliGRAM(s) Oral <User Schedule>  buprenorphine 8 mG/naloxone 2 mG SL Film 1 Film(s) SubLingual <User Schedule>  clindamycin IVPB 600 milliGRAM(s) IV Intermittent every 8 hours  clindamycin IVPB      heparin   Injectable 5000 Unit(s) SubCutaneous every 8 hours  influenza  Vaccine (HIGH DOSE) 0.5 milliLiter(s) IntraMuscular once  sodium chloride 0.9%. 1000 milliLiter(s) (100 mL/Hr) IV Continuous <Continuous>    MEDICATIONS  (PRN):  acetaminophen     Tablet .. 650 milliGRAM(s) Oral every 6 hours PRN Temp greater or equal to 38C (100.4F), Mild Pain (1 - 3)  melatonin 3 milliGRAM(s) Oral at bedtime PRN Insomnia        ROS: all systems reviewed and wnl      PHYSICAL EXAMINATION:  Vital Signs Last 24 Hrs  T(C): 37.3 (27 Nov 2024 05:17), Max: 39.1 (26 Nov 2024 16:25)  T(F): 99.1 (27 Nov 2024 05:17), Max: 102.3 (26 Nov 2024 16:25)  HR: 69 (27 Nov 2024 05:17) (69 - 90)  BP: 117/61 (27 Nov 2024 05:17) (104/54 - 148/76)  BP(mean): --  RR: 17 (27 Nov 2024 05:17) (17 - 18)  SpO2: 92% (27 Nov 2024 05:17) (92% - 95%)    Parameters below as of 26 Nov 2024 20:50  Patient On (Oxygen Delivery Method): room air      CAPILLARY BLOOD GLUCOSE          11-26 @ 07:01  -  11-27 @ 07:00  --------------------------------------------------------  IN: 700 mL / OUT: 400 mL / NET: 300 mL        GENERAL: stable, in bed, had fever last evening, LE fully wrapped.   NECK: supple, No JVD  CHEST/LUNG: clear to auscultation bilaterally; no rales, rhonchi, or wheezing b/l  HEART: normal S1, S2  ABDOMEN: BS+, soft, ND, NT   EXTREMITIES:  pulses palpable; no clubbing, cyanosis, or edema b/l LEs    LABS:                        7.2    20.53 )-----------( 298      ( 27 Nov 2024 06:20 )             23.6     11-27    135  |  104  |  31[H]  ----------------------------<  109[H]  3.6   |  24  |  1.05    Ca    7.9[L]      27 Nov 2024 06:20    TPro  7.3  /  Alb  3.2[L]  /  TBili  1.4[H]  /  DBili  x   /  AST  29  /  ALT  13  /  AlkPhos  95  11-26      Urinalysis Basic - ( 27 Nov 2024 06:20 )    Color: x / Appearance: x / SG: x / pH: x  Gluc: 109 mg/dL / Ketone: x  / Bili: x / Urobili: x   Blood: x / Protein: x / Nitrite: x   Leuk Esterase: x / RBC: x / WBC x   Sq Epi: x / Non Sq Epi: x / Bacteria: x

## 2024-11-28 LAB
HCT VFR BLD CALC: 27.8 % — LOW (ref 39–50)
HGB BLD-MCNC: 8.4 G/DL — LOW (ref 13–17)
MCHC RBC-ENTMCNC: 23.4 PG — LOW (ref 27–34)
MCHC RBC-ENTMCNC: 30.2 G/DL — LOW (ref 32–36)
MCV RBC AUTO: 77.4 FL — LOW (ref 80–100)
NRBC # BLD: 0 /100 WBCS — SIGNIFICANT CHANGE UP (ref 0–0)
NT-PROBNP SERPL-SCNC: 2423 PG/ML — HIGH (ref 0–125)
PLATELET # BLD AUTO: 359 K/UL — SIGNIFICANT CHANGE UP (ref 150–400)
RBC # BLD: 3.59 M/UL — LOW (ref 4.2–5.8)
RBC # FLD: 22.2 % — HIGH (ref 10.3–14.5)
WBC # BLD: 19.17 K/UL — HIGH (ref 3.8–10.5)
WBC # FLD AUTO: 19.17 K/UL — HIGH (ref 3.8–10.5)

## 2024-11-28 PROCEDURE — 74176 CT ABD & PELVIS W/O CONTRAST: CPT | Mod: 26

## 2024-11-28 PROCEDURE — 99232 SBSQ HOSP IP/OBS MODERATE 35: CPT

## 2024-11-28 RX ORDER — FUROSEMIDE 40 MG/1
40 TABLET ORAL DAILY
Refills: 0 | Status: DISCONTINUED | OUTPATIENT
Start: 2024-11-28 | End: 2024-12-01

## 2024-11-28 RX ADMIN — Medication 5000 UNIT(S): at 13:34

## 2024-11-28 RX ADMIN — CLINDAMYCIN HYDROCHLORIDE 100 MILLIGRAM(S): 300 CAPSULE ORAL at 13:34

## 2024-11-28 RX ADMIN — BUPRENORPHINE AND NALOXONE 1 FILM(S): 8; 2 TABLET SUBLINGUAL at 06:05

## 2024-11-28 RX ADMIN — Medication 2 MILLIGRAM(S): at 05:04

## 2024-11-28 RX ADMIN — CLINDAMYCIN HYDROCHLORIDE 100 MILLIGRAM(S): 300 CAPSULE ORAL at 05:04

## 2024-11-28 RX ADMIN — Medication 5000 UNIT(S): at 05:04

## 2024-11-28 RX ADMIN — Medication 2 MILLIGRAM(S): at 13:34

## 2024-11-28 RX ADMIN — Medication 2 MILLIGRAM(S): at 20:38

## 2024-11-28 RX ADMIN — Medication 5000 UNIT(S): at 21:32

## 2024-11-28 RX ADMIN — ACETAMINOPHEN 500MG 650 MILLIGRAM(S): 500 TABLET, COATED ORAL at 02:20

## 2024-11-28 RX ADMIN — ACETAMINOPHEN 500MG 650 MILLIGRAM(S): 500 TABLET, COATED ORAL at 21:30

## 2024-11-28 RX ADMIN — ACETAMINOPHEN 500MG 650 MILLIGRAM(S): 500 TABLET, COATED ORAL at 19:39

## 2024-11-28 RX ADMIN — BUPRENORPHINE AND NALOXONE 1 FILM(S): 8; 2 TABLET SUBLINGUAL at 17:48

## 2024-11-28 NOTE — PROGRESS NOTE ADULT - ASSESSMENT
Patient is a 69M, with PMHx of L knee replacement, OCD, Panic disorder, Anxiety, Opioid dependence on suboxone (from oxycodone use from back surgery, expected to finish long suboxone taper in 1 year), HTN, who comes in with a sudden onset of bilateral leg swelling that started 3 weeks ago. Admitted for BL leg swelling.    ##Leg swelling   P/w 1 month of BL leg swelling. BL cellulitis is very rare, but pt has BL LE edema with tenderness, warmth, and erythema, but symptoms are much worse on R. Duplex US venous reviewed negative DVT of BLEs. CT LE Marked soft tissue edema seen surrounding the bilateral lower extremities. TTE reviewed EF 69%; H2FPEF 49%  - C/w IV Lasix 20mg daily (home dose oral 20mg)  - c/w IV clinda; F/u cultures pending   - ID following     #Narcotic dependence.   Home med Suboxone (due to oxycodone use from back surgery, expected to finish long suboxone taper in 1 year)  - C/w Suboxone.    #Panic disorder  #OCD   #anxiety   Home med Xanax 2mg TID. Patient says he gets very bad panic attacks so he will not taper off of Xanax  - C/w home med.    #Anemia.   ·  Plan: - Microcytic anemia  - P/w Hgb 9.5 (it was 10.9 in May 2023)  - Possibly iron deficiency anemia  - No sign of bleeding  - Outpt follow up.    #HTN (hypertension).   · hold for now lisinopril 5mg.    Diet: DASH  DVT prophylaxis: [facility]  Dispo: admit   Code Status: FC     I have spent a total of *** minutes to prepare to see the patient, obtaining and reviewing history, physical examination, explaining the diagnosis, prognosis and treatment plan with the patient/family/caregiver. I also have spent the time ordering studies and testing, interpreting results, medicine reconciliation, IDRs, subspecialty consultation and documentation as above.       Patient is a 69M, with PMHx of L knee replacement, OCD, Panic disorder, Anxiety, Opioid dependence on suboxone (from oxycodone use from back surgery, expected to finish long suboxone taper in 1 year), HTN, who comes in with a sudden onset of bilateral leg swelling that started 3 weeks ago. Admitted for BL leg swelling.    ##Leg swelling   P/w 1 month of BL leg swelling. BL cellulitis is very rare, but pt has BL LE edema with tenderness, warmth, and erythema, but symptoms are much worse on R. Duplex US venous reviewed negative DVT of BLEs. CT LE Marked soft tissue edema seen surrounding the bilateral lower extremities. TTE reviewed EF 69%; H2FPEF 49%  - C/w IV Lasix 20mg daily (home dose oral 20mg)  - c/w IV clinda; F/u cultures pending   - ID following     #Narcotic dependence   Home med Suboxone (due to oxycodone use from back surgery, expected to finish long suboxone taper in 1 year)  - C/w Suboxone.    #Panic disorder  #OCD   #anxiety   Home med Xanax 2mg TID. Patient says he gets very bad panic attacks so he will not taper off of Xanax  - C/w home med.    #Anemia.   ·  Plan: - Microcytic anemia  - P/w Hgb 9.5 (it was 10.9 in May 2023)  - Possibly iron deficiency anemia  - No sign of bleeding  - Outpt follow up.    #HTN (hypertension).   · hold for now lisinopril 5mg.    Diet: DASH  DVT prophylaxis: [facility]  Dispo: admit   Code Status:      I have spent a total of 43 minutes to prepare to see the patient, obtaining and reviewing history, physical examination, explaining the diagnosis, prognosis and treatment plan with the patient/family/caregiver. I also have spent the time ordering studies and testing, interpreting results, medicine reconciliation, IDRs, subspecialty consultation and documentation as above.       Patient is a 69M, with PMHx of L knee replacement, OCD, Panic disorder, Anxiety, Opioid dependence on suboxone (from oxycodone use from back surgery, expected to finish long suboxone taper in 1 year), HTN, who comes in with a sudden onset of bilateral leg swelling that started 3 weeks ago. Admitted for BL leg swelling.    ##Leg swelling   P/w 1 month of BL leg swelling. BL cellulitis is very rare, but pt has BL LE edema with tenderness, warmth, and erythema, but symptoms are much worse on R. Duplex US venous reviewed negative DVT of BLEs. CT LE Marked soft tissue edema seen surrounding the bilateral lower extremities. TTE reviewed EF 69%; H2FPEF 49%. BNP 2423  - C/w IV Lasix 20mg daily (home dose oral 20mg)  - c/w IV clinda; F/u cultures pending   - ID following     #Narcotic dependence   Home med Suboxone (due to oxycodone use from back surgery, expected to finish long suboxone taper in 1 year)  - C/w Suboxone.    #Panic disorder  #OCD   #anxiety   Home med Xanax 2mg TID. Patient says he gets very bad panic attacks so he will not taper off of Xanax  - C/w home med.    #Anemia.   ·  Plan: - Microcytic anemia  - P/w Hgb 9.5 (it was 10.9 in May 2023)  - Possibly iron deficiency anemia  - No sign of bleeding  - Outpt follow up.    #HTN (hypertension).   · hold for now lisinopril 5mg.    Diet: DASH  DVT prophylaxis: [facility]  Dispo: admit   Code Status: FC     I have spent a total of 43 minutes to prepare to see the patient, obtaining and reviewing history, physical examination, explaining the diagnosis, prognosis and treatment plan with the patient/family/caregiver. I also have spent the time ordering studies and testing, interpreting results, medicine reconciliation, IDRs, subspecialty consultation and documentation as above.

## 2024-11-28 NOTE — PROGRESS NOTE ADULT - SUBJECTIVE AND OBJECTIVE BOX
Hospitalist Daily Progress Note     *SUBJECTIVE*    Interval Events:  NAEON, Resting comfortably. HD Stable.      *OBJECTIVE*    PHYSICAL EXAM:  GENERAL: NAD  CHEST/LUNG: Clear to auscultation bilaterally; No wheeze, rhonchi, rales  HEART: Regular rate and rhythm; No murmurs  ABDOMEN: Soft, Nontender, Nondistended; Bowel sounds present  EXTREMITIES: BL pitting LE +2 edema; With tenderness, warmth, and erythema below the knee  NEURO: AAOx3, non-focal  SKIN: Past surgical scar on L knee without any erythema or swelling    OBJECTIVE DATA:   Vital Signs Last 24 Hrs  T(C): 36.7 (28 Nov 2024 05:08), Max: 38.4 (27 Nov 2024 23:22)  T(F): 98.1 (28 Nov 2024 05:08), Max: 101.1 (27 Nov 2024 23:22)  HR: 72 (28 Nov 2024 05:08) (72 - 99)  BP: 123/66 (28 Nov 2024 05:08) (110/54 - 123/66)  BP(mean): --  RR: 20 (28 Nov 2024 05:08) (16 - 20)  SpO2: 98% (28 Nov 2024 05:08) (91% - 98%)    Parameters below as of 28 Nov 2024 05:08  Patient On (Oxygen Delivery Method): room air               Daily     Daily     Labs, Interval Radiology studies, Medications reviewed by me

## 2024-11-29 LAB
ANION GAP SERPL CALC-SCNC: 7 MMOL/L — SIGNIFICANT CHANGE UP (ref 5–17)
BASOPHILS # BLD AUTO: 0.22 K/UL — HIGH (ref 0–0.2)
BASOPHILS NFR BLD AUTO: 1.5 % — SIGNIFICANT CHANGE UP (ref 0–2)
BUN SERPL-MCNC: 12 MG/DL — SIGNIFICANT CHANGE UP (ref 7–23)
CALCIUM SERPL-MCNC: 8.6 MG/DL — SIGNIFICANT CHANGE UP (ref 8.5–10.1)
CHLORIDE SERPL-SCNC: 106 MMOL/L — SIGNIFICANT CHANGE UP (ref 96–108)
CO2 SERPL-SCNC: 25 MMOL/L — SIGNIFICANT CHANGE UP (ref 22–31)
CREAT SERPL-MCNC: 0.82 MG/DL — SIGNIFICANT CHANGE UP (ref 0.5–1.3)
EGFR: 96 ML/MIN/1.73M2 — SIGNIFICANT CHANGE UP
EOSINOPHIL # BLD AUTO: 0.03 K/UL — SIGNIFICANT CHANGE UP (ref 0–0.5)
EOSINOPHIL NFR BLD AUTO: 0.2 % — SIGNIFICANT CHANGE UP (ref 0–6)
GLUCOSE SERPL-MCNC: 99 MG/DL — SIGNIFICANT CHANGE UP (ref 70–99)
HCT VFR BLD CALC: 29 % — LOW (ref 39–50)
HGB BLD-MCNC: 8.6 G/DL — LOW (ref 13–17)
IMM GRANULOCYTES NFR BLD AUTO: 0.8 % — SIGNIFICANT CHANGE UP (ref 0–0.9)
LYMPHOCYTES # BLD AUTO: 1.47 K/UL — SIGNIFICANT CHANGE UP (ref 1–3.3)
LYMPHOCYTES # BLD AUTO: 9.8 % — LOW (ref 13–44)
MAGNESIUM SERPL-MCNC: 1.9 MG/DL — SIGNIFICANT CHANGE UP (ref 1.6–2.6)
MCHC RBC-ENTMCNC: 22.8 PG — LOW (ref 27–34)
MCHC RBC-ENTMCNC: 29.7 G/DL — LOW (ref 32–36)
MCV RBC AUTO: 76.7 FL — LOW (ref 80–100)
MONOCYTES # BLD AUTO: 1.4 K/UL — HIGH (ref 0–0.9)
MONOCYTES NFR BLD AUTO: 9.3 % — SIGNIFICANT CHANGE UP (ref 2–14)
NEUTROPHILS # BLD AUTO: 11.83 K/UL — HIGH (ref 1.8–7.4)
NEUTROPHILS NFR BLD AUTO: 78.4 % — HIGH (ref 43–77)
NRBC # BLD: 0 /100 WBCS — SIGNIFICANT CHANGE UP (ref 0–0)
PLATELET # BLD AUTO: 351 K/UL — SIGNIFICANT CHANGE UP (ref 150–400)
POTASSIUM SERPL-MCNC: 3.8 MMOL/L — SIGNIFICANT CHANGE UP (ref 3.5–5.3)
POTASSIUM SERPL-SCNC: 3.8 MMOL/L — SIGNIFICANT CHANGE UP (ref 3.5–5.3)
RBC # BLD: 3.78 M/UL — LOW (ref 4.2–5.8)
RBC # FLD: 21.6 % — HIGH (ref 10.3–14.5)
SODIUM SERPL-SCNC: 138 MMOL/L — SIGNIFICANT CHANGE UP (ref 135–145)
WBC # BLD: 15.07 K/UL — HIGH (ref 3.8–10.5)
WBC # FLD AUTO: 15.07 K/UL — HIGH (ref 3.8–10.5)

## 2024-11-29 PROCEDURE — 99232 SBSQ HOSP IP/OBS MODERATE 35: CPT

## 2024-11-29 RX ORDER — MEROPENEM 500 MG/1
1000 INJECTION, POWDER, FOR SOLUTION INTRAVENOUS EVERY 8 HOURS
Refills: 0 | Status: COMPLETED | OUTPATIENT
Start: 2024-11-29 | End: 2024-12-03

## 2024-11-29 RX ADMIN — ACETAMINOPHEN 500MG 650 MILLIGRAM(S): 500 TABLET, COATED ORAL at 20:54

## 2024-11-29 RX ADMIN — Medication 5000 UNIT(S): at 13:51

## 2024-11-29 RX ADMIN — Medication 2 MILLIGRAM(S): at 11:59

## 2024-11-29 RX ADMIN — Medication 2 MILLIGRAM(S): at 06:07

## 2024-11-29 RX ADMIN — Medication 5000 UNIT(S): at 06:08

## 2024-11-29 RX ADMIN — BUPRENORPHINE AND NALOXONE 1 FILM(S): 8; 2 TABLET SUBLINGUAL at 06:08

## 2024-11-29 RX ADMIN — Medication 2 MILLIGRAM(S): at 19:54

## 2024-11-29 RX ADMIN — MEROPENEM 100 MILLIGRAM(S): 500 INJECTION, POWDER, FOR SOLUTION INTRAVENOUS at 13:51

## 2024-11-29 RX ADMIN — Medication 5000 UNIT(S): at 22:41

## 2024-11-29 RX ADMIN — BUPRENORPHINE AND NALOXONE 1 FILM(S): 8; 2 TABLET SUBLINGUAL at 17:25

## 2024-11-29 RX ADMIN — ACETAMINOPHEN 500MG 650 MILLIGRAM(S): 500 TABLET, COATED ORAL at 19:54

## 2024-11-29 RX ADMIN — FUROSEMIDE 40 MILLIGRAM(S): 40 TABLET ORAL at 06:07

## 2024-11-29 RX ADMIN — MEROPENEM 100 MILLIGRAM(S): 500 INJECTION, POWDER, FOR SOLUTION INTRAVENOUS at 22:41

## 2024-11-29 NOTE — PROGRESS NOTE ADULT - ASSESSMENT
Patient is a 68 y/o man, with PMHx of L knee replacement, OCD, panic disorder, anxiety, opioid dependence-on Suboxone, and HTN, who comes in with a sudden onset of bilateral leg swelling that started 3 weeks ago. He woke up one day in the morning and noticed his legs were swollen. He went to his PCP Dr. Dr. Toney and was prescribed Lasix 20mg, which did not help. Patient denies headache, fever, chills, nausea, vomit, chest pain, shortness of breath, cough, lightheadedness, abdominal pain, diarrhea, constipation, dark/bloody stool, dysuria, hematuria, loss of strength, or loss of sensation. (24 Nov 2024 20:25)  ID consulted for workup and antibiotic management   Seen and examined at bedside. He is afebrile. Says no fever at home. Does not recall any triggering event, fall or trauma. Has pet cat that scratched his leg recently without any deep puncture wound. No recent antibiotics use. He has not had skin infections before. 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 He is not diabetic. He has no known allergies. Work up notable for leukocytosis, ESR: 29, CRP:47. US doppler without DVT. CT leg with soft tissue swelling and no fluid collection.    11/26: continues having fevers 102.3 today, RA, WBC increased 22.16, Cr elevated 1.66, BCs NGTD, MRSA PCR negative. Abx were changed by medicine to Clindamycin.  11/27: spiked a fever yesterday 102.3, no fevers today, RA, WBC with mild improvement 20.53, Cr normalized, BCs NGTD, two more sets were collected due to yesterday's fever, Clindamycin IV continued for now.    11/29:     Impression:  1. Right LE cellulitis  2. Bilateral LE edema  3. Leukocytosis  4. Hx of L knee replacement    Recommendations:  --Continue Clindamycin IV for now  --Awaiting for BCs x 2  --Leg elevation and warm compresses  --Trend temp curve and monitor white counts  --Stool count if having diarrhea   --Rest per primary    Discussed with Dr. Imam Lou Cota MD  Attending Physician  Division of Infectious Diseases   Available via [employer] Teams   Patient is a 70 y/o man, with PMHx of L knee replacement, OCD, panic disorder, anxiety, opioid dependence-on Suboxone, and HTN, who comes in with a sudden onset of bilateral leg swelling that started 3 weeks ago. He woke up one day in the morning and noticed his legs were swollen. He went to his PCP Dr. Dr. Toney and was prescribed Lasix 20mg, which did not help. Patient denies headache, fever, chills, nausea, vomit, chest pain, shortness of breath, cough, lightheadedness, abdominal pain, diarrhea, constipation, dark/bloody stool, dysuria, hematuria, loss of strength, or loss of sensation. (24 Nov 2024 20:25)  ID consulted for workup and antibiotic management   Seen and examined at bedside. He is afebrile. Says no fever at home. Does not recall any triggering event, fall or trauma. Has pet cat that scratched his leg recently without any deep puncture wound. No recent antibiotics use. He has not had skin infections before. 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 He is not diabetic. He has no known allergies. Work up notable for leukocytosis, ESR: 29, CRP:47. US doppler without DVT. CT leg with soft tissue swelling and no fluid collection.    11/26: continues having fevers 102.3 today, RA, WBC increased 22.16, Cr elevated 1.66, BCs NGTD, MRSA PCR negative. Abx were changed by medicine to Clindamycin.  11/27: spiked a fever yesterday 102.3, no fevers today, RA, WBC with mild improvement 20.53, Cr normalized, BCs NGTD, two more sets were collected due to yesterday's fever, Clindamycin IV continued for now.    11/29: Afebrile today. T-max 100.6 last night. WBC trended down to 15k. CT A/P with splenomegaly and colitis of rectosigmoid and descending colon. Would stop clinda and start meropenem today.    Impression:  1. Right LE cellulitis  2. Bilateral LE edema  3. Leukocytosis and fever spikes  4. Colitis  5. Splenomegaly  6. Hx of L knee replacement    Recommendations:  --DC clindamycin. Start meropenem 1 g IV q8h  --Consider hem/onc evaluation for splenomegaly and leukocytosis  --Leg elevation and warm compresses  --Trend temp curve and monitor white counts  --Stool count if having diarrhea   --Probiotics    Discussed with Dr. Imam Lou Cota MD  Attending Physician  Division of Infectious Diseases   Available via [employer] Teams

## 2024-11-29 NOTE — PROGRESS NOTE ADULT - SUBJECTIVE AND OBJECTIVE BOX
Hospitalist Daily Progress Note     *SUBJECTIVE*    Interval Events:  NAEON, Resting comfortably. HD Stable.      *OBJECTIVE*    PHYSICAL EXAM:  GENERAL: NAD  CHEST/LUNG: Clear to auscultation bilaterally; No wheeze, rhonchi, rales  HEART: Regular rate and rhythm; No murmurs  ABDOMEN: Soft, Nontender, Nondistended; Bowel sounds present  EXTREMITIES: BL pitting LE +2 edema; With tenderness, warmth, and erythema below the knee  NEURO: AAOx3, non-focal  SKIN: Past surgical scar on L knee without any erythema or swelling    OBJECTIVE DATA:   Vital Signs Last 24 Hrs  T(C): 37.3 (29 Nov 2024 05:25), Max: 38.1 (28 Nov 2024 20:25)  T(F): 99.1 (29 Nov 2024 05:25), Max: 100.6 (28 Nov 2024 20:25)  HR: 76 (29 Nov 2024 05:25) (76 - 78)  BP: 113/67 (29 Nov 2024 05:25) (113/67 - 127/83)  BP(mean): --  RR: 19 (29 Nov 2024 05:25) (19 - 19)  SpO2: 96% (29 Nov 2024 05:25) (93% - 98%)    Parameters below as of 29 Nov 2024 05:25  Patient On (Oxygen Delivery Method): room air               Daily     Daily     Labs, Interval Radiology studies, Medications reviewed by me

## 2024-11-29 NOTE — PROGRESS NOTE ADULT - SUBJECTIVE AND OBJECTIVE BOX
JUAQUIN JOHNSON  MRN-[id number]  69y (1955)    Follow Up:  Fever, cellulitis, leukocytosis     Interval History:       ROS:    [ ] Unobtainable because:  [x ] All other systems negative    Constitutional: no fever, no chills  Head: no trauma  Eyes: no vision changes, no eye pain  ENT:  no sore throat, no rhinorrhea  Cardiovascular:  no chest pain, no palpitation  Respiratory:  no SOB, no cough  GI:  no abd pain, no vomiting, no diarrhea  urinary: no dysuria, no hematuria, no flank pain  musculoskeletal:  no joint pain, no joint swelling  skin:  no rash  neurology:  no headache, no seizure, no change in mental status  psych: no anxiety, no depression         Allergies  No Known Allergies        ANTIMICROBIALS:  clindamycin IVPB 600 every 8 hours  clindamycin IVPB        OTHER MEDS:  acetaminophen     Tablet .. 650 milliGRAM(s) Oral every 6 hours PRN  ALPRAZolam 2 milliGRAM(s) Oral <User Schedule>  buprenorphine 8 mG/naloxone 2 mG SL Film 1 Film(s) SubLingual <User Schedule>  heparin   Injectable 5000 Unit(s) SubCutaneous every 8 hours  influenza  Vaccine (HIGH DOSE) 0.5 milliLiter(s) IntraMuscular once  melatonin 3 milliGRAM(s) Oral at bedtime PRN      Physical Exam:  Vital Signs Last 24 Hrs  T(C): 37.3 (29 Nov 2024 05:25), Max: 38.1 (28 Nov 2024 20:25)  T(F): 99.1 (29 Nov 2024 05:25), Max: 100.6 (28 Nov 2024 20:25)  HR: 76 (29 Nov 2024 05:25) (72 - 78)  BP: 113/67 (29 Nov 2024 05:25) (104/58 - 127/83)  BP(mean): --  RR: 19 (29 Nov 2024 05:25) (19 - 19)  SpO2: 96% (29 Nov 2024 05:25) (93% - 98%)    Parameters below as of 29 Nov 2024 05:25  Patient On (Oxygen Delivery Method): room air      Constitutional: Pleasant man, non-toxic, no distress  HEAD/EYES: anicteric, no conjunctival injection  ENT:  supple, no thrush  Cardiovascular:   normal S1, S2, no murmur   Respiratory:  clear BS bilaterally, no wheezes, no rales  GI:  soft, mild left sided tenderness, normal bowel sounds, not distended   :  no corley, no CVA tenderness  Musculoskeletal:  Bilateral LE erythema improved, warmth is better, still with swelling R>L, wrapped b/l LEs with ace bandages  Neurologic: awake and alert, normal strength, no focal findings  Skin:  Bilateral onychomycosis  Heme/Onc: no lymphadenopathy   Psychiatric:  awake, alert, appropriate mood    WBC Count: 15.07 K/uL (11-29 @ 06:59)  WBC Count: 19.17 K/uL (11-28 @ 06:15)  WBC Count: 20.53 K/uL (11-27 @ 06:20)  WBC Count: 22.16 K/uL (11-26 @ 08:03)  WBC Count: 21.25 K/uL (11-25 @ 05:55)  WBC Count: 17.14 K/uL (11-24 @ 15:15)                          8.6    15.07 )-----------( 351      ( 29 Nov 2024 06:59 )             29.0   11-29    138  |  106  |  12  ----------------------------<  99  3.8   |  25  |  0.82    Ca    8.6      29 Nov 2024 06:59  Mg     1.9     11-29        Urinalysis Basic - ( 27 Nov 2024 06:20 )    Color: x / Appearance: x / SG: x / pH: x  Gluc: 109 mg/dL / Ketone: x  / Bili: x / Urobili: x   Blood: x / Protein: x / Nitrite: x   Leuk Esterase: x / RBC: x / WBC x   Sq Epi: x / Non Sq Epi: x / Bacteria: x        Creatinine Trend: 1.05<--, 1.66<--, 1.17<--, 1.20<--      MICROBIOLOGY:  v  .Blood BLOOD  11-24-24   No growth at 48 Hours  --  --      .Blood BLOOD  11-24-24   No growth at 48 Hours  --  --    C-Reactive Protein: 47 (11-24)      RADIOLOGY:  < from: CT Abdomen and Pelvis No Cont (11.28.24 @ 12:35) >  IMPRESSION:  Colitis of the rectosigmoid and descending colon.  Splenomegaly.    < end of copied text >    < from: CT Lower Extremity w/ IV Cont, Bilateral (11.25.24 @ 01:29) >  IMPRESSION:  Marked soft tissue edema seen surrounding the bilateral lower   extremities. No mature, drainable, or enhancing collection is seen at   this time.    < end of copied text >    < from: US Duplex Venous Lower Ext Complete, Bilateral (11.24.24 @ 16:55) >  IMPRESSION:  Calf veins not well seen bilaterally with no thrombus in the visualized   left posterior tibial vein.  No evidence of deep venous thrombosis in the common femoral, femoral, and   popliteal veins bilaterally.    < end of copied text >     JUAQUIN JOHNSON  MRN-[id number]  69y (1955)    Follow Up:  Fever, cellulitis, leukocytosis     Interval History:       ROS:    [ ] Unobtainable because:  [x ] All other systems negative    Constitutional: no fever, no chills  Head: no trauma  Eyes: no vision changes, no eye pain  ENT:  no sore throat, no rhinorrhea  Cardiovascular:  no chest pain, no palpitation  Respiratory:  no SOB, no cough  GI:  no abd pain, no vomiting, no diarrhea  urinary: no dysuria, no hematuria, no flank pain  musculoskeletal:  no joint pain, no joint swelling  skin:  no rash  neurology:  no headache, no seizure, no change in mental status  psych: no anxiety, no depression         Allergies  No Known Allergies        ANTIMICROBIALS:    MEDICATIONS  (STANDING):  ceFAZolin   IVPB   100 mL/Hr IV Intermittent (11-26-24 @ 06:29)   100 mL/Hr IV Intermittent (11-25-24 @ 23:23)   100 mL/Hr IV Intermittent (11-25-24 @ 13:22)   100 mL/Hr IV Intermittent (11-25-24 @ 06:22)   100 mL/Hr IV Intermittent (11-24-24 @ 23:12)    clindamycin IVPB   100 mL/Hr IV Intermittent (11-26-24 @ 14:18)    clindamycin IVPB   100 mL/Hr IV Intermittent (11-28-24 @ 13:34)   100 mL/Hr IV Intermittent (11-28-24 @ 05:04)   100 mL/Hr IV Intermittent (11-27-24 @ 21:04)   100 mL/Hr IV Intermittent (11-27-24 @ 13:08)   100 mL/Hr IV Intermittent (11-27-24 @ 05:29)   100 mL/Hr IV Intermittent (11-26-24 @ 21:58)    piperacillin/tazobactam IVPB...   200 mL/Hr IV Intermittent (11-24-24 @ 19:34)    vancomycin  IVPB.   250 mL/Hr IV Intermittent (11-24-24 @ 20:03)      Physical Exam:  Vital Signs Last 24 Hrs  T(C): 37.3 (29 Nov 2024 05:25), Max: 38.1 (28 Nov 2024 20:25)  T(F): 99.1 (29 Nov 2024 05:25), Max: 100.6 (28 Nov 2024 20:25)  HR: 76 (29 Nov 2024 05:25) (72 - 78)  BP: 113/67 (29 Nov 2024 05:25) (104/58 - 127/83)  BP(mean): --  RR: 19 (29 Nov 2024 05:25) (19 - 19)  SpO2: 96% (29 Nov 2024 05:25) (93% - 98%)    Parameters below as of 29 Nov 2024 05:25  Patient On (Oxygen Delivery Method): room air      Constitutional: Pleasant man, non-toxic, no distress  HEAD/EYES: anicteric, no conjunctival injection  ENT:  supple, no thrush  Cardiovascular:   normal S1, S2, no murmur   Respiratory:  clear BS bilaterally, no wheezes, no rales  GI:  soft, mild left sided tenderness, normal bowel sounds, not distended   :  no corley, no CVA tenderness  Musculoskeletal:  Bilateral LE erythema improved, warmth is better, still with swelling R>L, wrapped b/l LEs with ace bandages  Neurologic: awake and alert, normal strength, no focal findings  Skin:  Bilateral onychomycosis  Heme/Onc: no lymphadenopathy   Psychiatric:  awake, alert, appropriate mood    WBC Count: 15.07 K/uL (11-29 @ 06:59)  WBC Count: 19.17 K/uL (11-28 @ 06:15)  WBC Count: 20.53 K/uL (11-27 @ 06:20)  WBC Count: 22.16 K/uL (11-26 @ 08:03)  WBC Count: 21.25 K/uL (11-25 @ 05:55)  WBC Count: 17.14 K/uL (11-24 @ 15:15)                          8.6    15.07 )-----------( 351      ( 29 Nov 2024 06:59 )             29.0   11-29    138  |  106  |  12  ----------------------------<  99  3.8   |  25  |  0.82    Ca    8.6      29 Nov 2024 06:59  Mg     1.9     11-29        Urinalysis Basic - ( 27 Nov 2024 06:20 )    Color: x / Appearance: x / SG: x / pH: x  Gluc: 109 mg/dL / Ketone: x  / Bili: x / Urobili: x   Blood: x / Protein: x / Nitrite: x   Leuk Esterase: x / RBC: x / WBC x   Sq Epi: x / Non Sq Epi: x / Bacteria: x        Creatinine Trend: 1.05<--, 1.66<--, 1.17<--, 1.20<--      MICROBIOLOGY:  v  .Blood BLOOD  11-24-24   No growth at 48 Hours  --  --      .Blood BLOOD  11-24-24   No growth at 48 Hours  --  --    C-Reactive Protein: 47 (11-24)      RADIOLOGY:  < from: CT Abdomen and Pelvis No Cont (11.28.24 @ 12:35) >  IMPRESSION:  Colitis of the rectosigmoid and descending colon.  Splenomegaly.    < end of copied text >    < from: CT Lower Extremity w/ IV Cont, Bilateral (11.25.24 @ 01:29) >  IMPRESSION:  Marked soft tissue edema seen surrounding the bilateral lower   extremities. No mature, drainable, or enhancing collection is seen at   this time.    < end of copied text >    < from: US Duplex Venous Lower Ext Complete, Bilateral (11.24.24 @ 16:55) >  IMPRESSION:  Calf veins not well seen bilaterally with no thrombus in the visualized   left posterior tibial vein.  No evidence of deep venous thrombosis in the common femoral, femoral, and   popliteal veins bilaterally.    < end of copied text >     JUAQUIN JOHNSON  MRN-[id number]  69y (1955)    Follow Up:  Fever, cellulitis, leukocytosis     Interval History: Seen and examined at bedside. He is afebrile. T-max was 100.6 F last night. He c/o diarrhea. Has some mild abdominal discomfort too. CT results were discussed with him.      ROS:    [ ] Unobtainable because:  [x ] All other systems negative    Constitutional: no fever, no chills  Head: no trauma  Eyes: no vision changes, no eye pain  ENT:  no sore throat, no rhinorrhea  Cardiovascular:  no chest pain, no palpitation  Respiratory:  no SOB, no cough  GI:  no abd pain, no vomiting, no diarrhea  urinary: no dysuria, no hematuria, no flank pain  musculoskeletal:  no joint pain, no joint swelling  skin:  no rash  neurology:  no headache, no seizure, no change in mental status  psych: no anxiety, no depression         Allergies  No Known Allergies        ANTIMICROBIALS:    MEDICATIONS  (STANDING):  ceFAZolin   IVPB   100 mL/Hr IV Intermittent (11-26-24 @ 06:29)   100 mL/Hr IV Intermittent (11-25-24 @ 23:23)   100 mL/Hr IV Intermittent (11-25-24 @ 13:22)   100 mL/Hr IV Intermittent (11-25-24 @ 06:22)   100 mL/Hr IV Intermittent (11-24-24 @ 23:12)    clindamycin IVPB   100 mL/Hr IV Intermittent (11-26-24 @ 14:18)    clindamycin IVPB   100 mL/Hr IV Intermittent (11-28-24 @ 13:34)   100 mL/Hr IV Intermittent (11-28-24 @ 05:04)   100 mL/Hr IV Intermittent (11-27-24 @ 21:04)   100 mL/Hr IV Intermittent (11-27-24 @ 13:08)   100 mL/Hr IV Intermittent (11-27-24 @ 05:29)   100 mL/Hr IV Intermittent (11-26-24 @ 21:58)    piperacillin/tazobactam IVPB...   200 mL/Hr IV Intermittent (11-24-24 @ 19:34)    vancomycin  IVPB.   250 mL/Hr IV Intermittent (11-24-24 @ 20:03)      Physical Exam:  Vital Signs Last 24 Hrs  T(C): 37.3 (29 Nov 2024 05:25), Max: 38.1 (28 Nov 2024 20:25)  T(F): 99.1 (29 Nov 2024 05:25), Max: 100.6 (28 Nov 2024 20:25)  HR: 76 (29 Nov 2024 05:25) (72 - 78)  BP: 113/67 (29 Nov 2024 05:25) (104/58 - 127/83)  BP(mean): --  RR: 19 (29 Nov 2024 05:25) (19 - 19)  SpO2: 96% (29 Nov 2024 05:25) (93% - 98%)    Parameters below as of 29 Nov 2024 05:25  Patient On (Oxygen Delivery Method): room air      Constitutional: Pleasant man, non-toxic, no distress  HEAD/EYES: anicteric, no conjunctival injection  ENT:  supple, no thrush  Cardiovascular:   normal S1, S2, no murmur   Respiratory:  clear BS bilaterally, no wheezes, no rales  GI:  soft, mild left sided tenderness, normal bowel sounds, not distended   :  no corley, no CVA tenderness  Musculoskeletal:  Bilateral LE erythema and swelling significantly improved   Neurologic: awake and alert, normal strength, no focal findings  Skin:  Bilateral onychomycosis  Heme/Onc: no lymphadenopathy   Psychiatric:  awake, alert, appropriate mood    WBC Count: 15.07 K/uL (11-29 @ 06:59)  WBC Count: 19.17 K/uL (11-28 @ 06:15)  WBC Count: 20.53 K/uL (11-27 @ 06:20)  WBC Count: 22.16 K/uL (11-26 @ 08:03)  WBC Count: 21.25 K/uL (11-25 @ 05:55)  WBC Count: 17.14 K/uL (11-24 @ 15:15)                          8.6    15.07 )-----------( 351      ( 29 Nov 2024 06:59 )             29.0   11-29    138  |  106  |  12  ----------------------------<  99  3.8   |  25  |  0.82    Ca    8.6      29 Nov 2024 06:59  Mg     1.9     11-29        Urinalysis Basic - ( 27 Nov 2024 06:20 )    Color: x / Appearance: x / SG: x / pH: x  Gluc: 109 mg/dL / Ketone: x  / Bili: x / Urobili: x   Blood: x / Protein: x / Nitrite: x   Leuk Esterase: x / RBC: x / WBC x   Sq Epi: x / Non Sq Epi: x / Bacteria: x        Creatinine Trend: 1.05<--, 1.66<--, 1.17<--, 1.20<--      MICROBIOLOGY:  .Blood BLOOD  11-24-24   No growth at 48 Hours  --  --      .Blood BLOOD  11-24-24   No growth at 48 Hours  --  --    C-Reactive Protein: 47 (11-24)      RADIOLOGY:  < from: CT Abdomen and Pelvis No Cont (11.28.24 @ 12:35) >  IMPRESSION:  Colitis of the rectosigmoid and descending colon.  Splenomegaly.    < end of copied text >    < from: CT Lower Extremity w/ IV Cont, Bilateral (11.25.24 @ 01:29) >  IMPRESSION:  Marked soft tissue edema seen surrounding the bilateral lower   extremities. No mature, drainable, or enhancing collection is seen at   this time.    < end of copied text >    < from: US Duplex Venous Lower Ext Complete, Bilateral (11.24.24 @ 16:55) >  IMPRESSION:  Calf veins not well seen bilaterally with no thrombus in the visualized   left posterior tibial vein.  No evidence of deep venous thrombosis in the common femoral, femoral, and   popliteal veins bilaterally.    < end of copied text >

## 2024-11-29 NOTE — PROGRESS NOTE ADULT - ASSESSMENT
Patient is a 69M, with PMHx of L knee replacement, OCD, Panic disorder, Anxiety, Opioid dependence on suboxone (from oxycodone use from back surgery, expected to finish long suboxone taper in 1 year), HTN, who comes in with a sudden onset of bilateral leg swelling that started 3 weeks ago. Admitted for BL leg swelling.    ##Leg swelling   ##Colitis   P/w 1 month of BL leg swelling. BL cellulitis is very rare, but pt has BL LE edema with tenderness, warmth, and erythema, but symptoms are much worse on R. Duplex US venous reviewed negative DVT of BLEs. CT LE Marked soft tissue edema seen surrounding the bilateral lower extremities. TTE reviewed EF 69%; H2FPEF 49%. BNP 2423. CTAP with colitis   - C/w IV Lasix 20mg daily (home dose oral 20mg)  - Abx changed to IV ewa; F/u cultures pending   - ID following     #Narcotic dependence   Home med Suboxone (due to oxycodone use from back surgery, expected to finish long suboxone taper in 1 year)  - C/w Suboxone.    #Panic disorder  #OCD   #anxiety   Home med Xanax 2mg TID. Patient says he gets very bad panic attacks so he will not taper off of Xanax  - C/w home med.    #Anemia.   ·  Plan: - Microcytic anemia  - P/w Hgb 9.5 (it was 10.9 in May 2023)  - Possibly iron deficiency anemia  - No sign of bleeding  - Outpt follow up.    #HTN (hypertension).   · hold for now lisinopril 5mg.    Diet: DASH  DVT prophylaxis: [facility]  Dispo: admit   Code Status: FC     I have spent a total of 40 minutes to prepare to see the patient, obtaining and reviewing history, physical examination, explaining the diagnosis, prognosis and treatment plan with the patient/family/caregiver. I also have spent the time ordering studies and testing, interpreting results, medicine reconciliation, IDRs, subspecialty consultation and documentation as above.

## 2024-11-30 LAB
ANION GAP SERPL CALC-SCNC: 6 MMOL/L — SIGNIFICANT CHANGE UP (ref 5–17)
BASOPHILS # BLD AUTO: 0.25 K/UL — HIGH (ref 0–0.2)
BASOPHILS NFR BLD AUTO: 1.7 % — SIGNIFICANT CHANGE UP (ref 0–2)
BUN SERPL-MCNC: 12 MG/DL — SIGNIFICANT CHANGE UP (ref 7–23)
CALCIUM SERPL-MCNC: 8.6 MG/DL — SIGNIFICANT CHANGE UP (ref 8.5–10.1)
CHLORIDE SERPL-SCNC: 106 MMOL/L — SIGNIFICANT CHANGE UP (ref 96–108)
CO2 SERPL-SCNC: 28 MMOL/L — SIGNIFICANT CHANGE UP (ref 22–31)
CREAT SERPL-MCNC: 0.75 MG/DL — SIGNIFICANT CHANGE UP (ref 0.5–1.3)
CULTURE RESULTS: SIGNIFICANT CHANGE UP
CULTURE RESULTS: SIGNIFICANT CHANGE UP
EGFR: 99 ML/MIN/1.73M2 — SIGNIFICANT CHANGE UP
EOSINOPHIL # BLD AUTO: 0.29 K/UL — SIGNIFICANT CHANGE UP (ref 0–0.5)
EOSINOPHIL NFR BLD AUTO: 2 % — SIGNIFICANT CHANGE UP (ref 0–6)
GLUCOSE SERPL-MCNC: 85 MG/DL — SIGNIFICANT CHANGE UP (ref 70–99)
HCT VFR BLD CALC: 29.2 % — LOW (ref 39–50)
HGB BLD-MCNC: 8.4 G/DL — LOW (ref 13–17)
IMM GRANULOCYTES NFR BLD AUTO: 1.1 % — HIGH (ref 0–0.9)
LYMPHOCYTES # BLD AUTO: 1.52 K/UL — SIGNIFICANT CHANGE UP (ref 1–3.3)
LYMPHOCYTES # BLD AUTO: 10.2 % — LOW (ref 13–44)
MAGNESIUM SERPL-MCNC: 1.7 MG/DL — SIGNIFICANT CHANGE UP (ref 1.6–2.6)
MCHC RBC-ENTMCNC: 22.6 PG — LOW (ref 27–34)
MCHC RBC-ENTMCNC: 28.8 G/DL — LOW (ref 32–36)
MCV RBC AUTO: 78.5 FL — LOW (ref 80–100)
MONOCYTES # BLD AUTO: 1.12 K/UL — HIGH (ref 0–0.9)
MONOCYTES NFR BLD AUTO: 7.5 % — SIGNIFICANT CHANGE UP (ref 2–14)
NEUTROPHILS # BLD AUTO: 11.52 K/UL — HIGH (ref 1.8–7.4)
NEUTROPHILS NFR BLD AUTO: 77.5 % — HIGH (ref 43–77)
NRBC # BLD: 0 /100 WBCS — SIGNIFICANT CHANGE UP (ref 0–0)
PLATELET # BLD AUTO: 379 K/UL — SIGNIFICANT CHANGE UP (ref 150–400)
POTASSIUM SERPL-MCNC: 3.9 MMOL/L — SIGNIFICANT CHANGE UP (ref 3.5–5.3)
POTASSIUM SERPL-SCNC: 3.9 MMOL/L — SIGNIFICANT CHANGE UP (ref 3.5–5.3)
RBC # BLD: 3.72 M/UL — LOW (ref 4.2–5.8)
RBC # FLD: 21.8 % — HIGH (ref 10.3–14.5)
SODIUM SERPL-SCNC: 140 MMOL/L — SIGNIFICANT CHANGE UP (ref 135–145)
SPECIMEN SOURCE: SIGNIFICANT CHANGE UP
SPECIMEN SOURCE: SIGNIFICANT CHANGE UP
WBC # BLD: 14.87 K/UL — HIGH (ref 3.8–10.5)
WBC # FLD AUTO: 14.87 K/UL — HIGH (ref 3.8–10.5)

## 2024-11-30 PROCEDURE — 99232 SBSQ HOSP IP/OBS MODERATE 35: CPT

## 2024-11-30 RX ORDER — ACETAMINOPHEN, DIPHENHYDRAMINE HCL, PHENYLEPHRINE HCL 325; 25; 5 MG/1; MG/1; MG/1
3 TABLET ORAL AT BEDTIME
Refills: 0 | Status: DISCONTINUED | OUTPATIENT
Start: 2024-11-30 | End: 2024-12-05

## 2024-11-30 RX ADMIN — ACETAMINOPHEN, DIPHENHYDRAMINE HCL, PHENYLEPHRINE HCL 3 MILLIGRAM(S): 325; 25; 5 TABLET ORAL at 00:34

## 2024-11-30 RX ADMIN — MEROPENEM 100 MILLIGRAM(S): 500 INJECTION, POWDER, FOR SOLUTION INTRAVENOUS at 22:05

## 2024-11-30 RX ADMIN — BUPRENORPHINE AND NALOXONE 1 FILM(S): 8; 2 TABLET SUBLINGUAL at 05:29

## 2024-11-30 RX ADMIN — MEROPENEM 100 MILLIGRAM(S): 500 INJECTION, POWDER, FOR SOLUTION INTRAVENOUS at 05:30

## 2024-11-30 RX ADMIN — Medication 5000 UNIT(S): at 13:22

## 2024-11-30 RX ADMIN — BUPRENORPHINE AND NALOXONE 1 FILM(S): 8; 2 TABLET SUBLINGUAL at 17:52

## 2024-11-30 RX ADMIN — Medication 2 MILLIGRAM(S): at 05:29

## 2024-11-30 RX ADMIN — MEROPENEM 100 MILLIGRAM(S): 500 INJECTION, POWDER, FOR SOLUTION INTRAVENOUS at 13:19

## 2024-11-30 RX ADMIN — FUROSEMIDE 40 MILLIGRAM(S): 40 TABLET ORAL at 05:29

## 2024-11-30 RX ADMIN — ACETAMINOPHEN, DIPHENHYDRAMINE HCL, PHENYLEPHRINE HCL 3 MILLIGRAM(S): 325; 25; 5 TABLET ORAL at 22:05

## 2024-11-30 RX ADMIN — Medication 5000 UNIT(S): at 22:06

## 2024-11-30 RX ADMIN — Medication 2 MILLIGRAM(S): at 20:01

## 2024-11-30 RX ADMIN — Medication 2 MILLIGRAM(S): at 11:58

## 2024-11-30 RX ADMIN — Medication 5000 UNIT(S): at 05:30

## 2024-11-30 NOTE — PROGRESS NOTE ADULT - SUBJECTIVE AND OBJECTIVE BOX
PROGRESS NOTE:     Patient is a 69y old  Male who presents with a chief complaint of Bilateral leg swelling (29 Nov 2024 14:14)      SUBJECTIVE / OVERNIGHT EVENTS:No acute overnight events. patient states he has mild abd pain and diarrhea improving. LE swelling is getting better.         MEDICATIONS  (STANDING):  ALPRAZolam 2 milliGRAM(s) Oral <User Schedule>  buprenorphine 8 mG/naloxone 2 mG SL Film 1 Film(s) SubLingual <User Schedule>  furosemide   Injectable 40 milliGRAM(s) IV Push daily  heparin   Injectable 5000 Unit(s) SubCutaneous every 8 hours  influenza  Vaccine (HIGH DOSE) 0.5 milliLiter(s) IntraMuscular once  melatonin 3 milliGRAM(s) Oral at bedtime  meropenem  IVPB 1000 milliGRAM(s) IV Intermittent every 8 hours    MEDICATIONS  (PRN):  acetaminophen     Tablet .. 650 milliGRAM(s) Oral every 6 hours PRN Temp greater or equal to 38C (100.4F), Mild Pain (1 - 3)      CAPILLARY BLOOD GLUCOSE        I&O's Summary    29 Nov 2024 07:01  -  30 Nov 2024 07:00  --------------------------------------------------------  IN: 0 mL / OUT: 300 mL / NET: -300 mL        PHYSICAL EXAM:  Vital Signs Last 24 Hrs  T(C): 37 (30 Nov 2024 12:14), Max: 37 (30 Nov 2024 12:14)  T(F): 98.6 (30 Nov 2024 12:14), Max: 98.6 (30 Nov 2024 12:14)  HR: 79 (30 Nov 2024 12:14) (65 - 79)  BP: 129/65 (30 Nov 2024 12:14) (118/70 - 135/76)  BP(mean): --  RR: 18 (30 Nov 2024 12:14) (18 - 18)  SpO2: 95% (30 Nov 2024 12:14) (95% - 98%)    Parameters below as of 30 Nov 2024 12:14  Patient On (Oxygen Delivery Method): room air        PHYSICAL EXAM:  GENERAL: NAD  CHEST/LUNG: Clear to auscultation bilaterally; No wheeze, rhonchi, rales  HEART: Regular rate and rhythm; No murmurs  ABDOMEN: Soft, Nontender, Nondistended; Bowel sounds present  EXTREMITIES: RLE edema. With tenderness, warmth, and erythema below the knee  NEURO: AAOx3, non-focal  SKIN: Past surgical scar on L knee without any erythema or swelling    LABS:                        8.4    14.87 )-----------( 379      ( 30 Nov 2024 05:25 )             29.2     11-30    140  |  106  |  12  ----------------------------<  85  3.9   |  28  |  0.75    Ca    8.6      30 Nov 2024 05:25  Mg     1.7     11-30            Urinalysis Basic - ( 30 Nov 2024 05:25 )    Color: x / Appearance: x / SG: x / pH: x  Gluc: 85 mg/dL / Ketone: x  / Bili: x / Urobili: x   Blood: x / Protein: x / Nitrite: x   Leuk Esterase: x / RBC: x / WBC x   Sq Epi: x / Non Sq Epi: x / Bacteria: x          RADIOLOGY & ADDITIONAL TESTS:  Results Reviewed:   Imaging Personally Reviewed:  Electrocardiogram Personally Reviewed:    COORDINATION OF CARE:  Care Discussed with Consultants/Other Providers [Y/N]:  Prior or Outpatient Records Reviewed [Y/N]:

## 2024-11-30 NOTE — PROGRESS NOTE ADULT - ASSESSMENT
69M, with MHx of L knee replacement, OCD, Panic disorder, Anxiety, Opioid dependence on suboxone (from oxycodone use from back surgery, expected to finish long suboxone taper in 1 year), HTN, who comes in with a sudden onset of bilateral leg swelling that started 3 weeks ago. Admitted for cellulitis +/- colitis    ##Leg swelling   ##Colitis   P/w 1 month of BL leg swelling. LLE swelling is improved. LLE still swollen.    TTE reviewed EF 69%; H2FPEF 49%. BNP 2423. CTAP with colitis   - C/w IV Lasix 20mg daily (home dose oral 20mg) for now  - Abx changed to IV ewa; F/u cultures pending   - ID following     #Narcotic dependence   Home med Suboxone (due to oxycodone use from back surgery, expected to finish long suboxone taper in 1 year)  - C/w Suboxone.    #Panic disorder  #OCD   #anxiety   Home med Xanax 2mg TID. Patient says he gets very bad panic attacks so he will not taper off of Xanax  - C/w home med.    #Anemia.   ·  Plan: - Microcytic anemia  - P/w Hgb 9.5 (it was 10.9 in May 2023)  - Possibly iron deficiency anemia  - No sign of bleeding  - Outpt follow up.    #HTN (hypertension).   · hold for now lisinopril 5mg.    Diet: DASH  DVT prophylaxis: [facility]  Dispo: admit   Code Status: MARKUS

## 2024-12-01 LAB
ANION GAP SERPL CALC-SCNC: 9 MMOL/L — SIGNIFICANT CHANGE UP (ref 5–17)
BUN SERPL-MCNC: 9 MG/DL — SIGNIFICANT CHANGE UP (ref 7–23)
CALCIUM SERPL-MCNC: 8.9 MG/DL — SIGNIFICANT CHANGE UP (ref 8.5–10.1)
CHLORIDE SERPL-SCNC: 103 MMOL/L — SIGNIFICANT CHANGE UP (ref 96–108)
CO2 SERPL-SCNC: 27 MMOL/L — SIGNIFICANT CHANGE UP (ref 22–31)
CREAT SERPL-MCNC: 0.77 MG/DL — SIGNIFICANT CHANGE UP (ref 0.5–1.3)
EGFR: 98 ML/MIN/1.73M2 — SIGNIFICANT CHANGE UP
GLUCOSE SERPL-MCNC: 96 MG/DL — SIGNIFICANT CHANGE UP (ref 70–99)
HCT VFR BLD CALC: 33.6 % — LOW (ref 39–50)
HGB BLD-MCNC: 9.8 G/DL — LOW (ref 13–17)
MCHC RBC-ENTMCNC: 22.7 PG — LOW (ref 27–34)
MCHC RBC-ENTMCNC: 29.2 G/DL — LOW (ref 32–36)
MCV RBC AUTO: 78 FL — LOW (ref 80–100)
NRBC # BLD: 0 /100 WBCS — SIGNIFICANT CHANGE UP (ref 0–0)
PLATELET # BLD AUTO: 448 K/UL — HIGH (ref 150–400)
POTASSIUM SERPL-MCNC: 3.3 MMOL/L — LOW (ref 3.5–5.3)
POTASSIUM SERPL-SCNC: 3.3 MMOL/L — LOW (ref 3.5–5.3)
RBC # BLD: 4.31 M/UL — SIGNIFICANT CHANGE UP (ref 4.2–5.8)
RBC # FLD: 22.3 % — HIGH (ref 10.3–14.5)
SODIUM SERPL-SCNC: 139 MMOL/L — SIGNIFICANT CHANGE UP (ref 135–145)
WBC # BLD: 17.02 K/UL — HIGH (ref 3.8–10.5)
WBC # FLD AUTO: 17.02 K/UL — HIGH (ref 3.8–10.5)

## 2024-12-01 PROCEDURE — 99232 SBSQ HOSP IP/OBS MODERATE 35: CPT

## 2024-12-01 RX ORDER — POTASSIUM CHLORIDE 600 MG/1
40 TABLET, EXTENDED RELEASE ORAL ONCE
Refills: 0 | Status: COMPLETED | OUTPATIENT
Start: 2024-12-01 | End: 2024-12-01

## 2024-12-01 RX ORDER — FUROSEMIDE 40 MG/1
20 TABLET ORAL DAILY
Refills: 0 | Status: DISCONTINUED | OUTPATIENT
Start: 2024-12-02 | End: 2024-12-05

## 2024-12-01 RX ADMIN — Medication 2 MILLIGRAM(S): at 13:53

## 2024-12-01 RX ADMIN — Medication 5000 UNIT(S): at 13:52

## 2024-12-01 RX ADMIN — FUROSEMIDE 40 MILLIGRAM(S): 40 TABLET ORAL at 05:12

## 2024-12-01 RX ADMIN — Medication 5000 UNIT(S): at 05:12

## 2024-12-01 RX ADMIN — ACETAMINOPHEN 500MG 650 MILLIGRAM(S): 500 TABLET, COATED ORAL at 23:44

## 2024-12-01 RX ADMIN — Medication 2 MILLIGRAM(S): at 05:12

## 2024-12-01 RX ADMIN — BUPRENORPHINE AND NALOXONE 1 FILM(S): 8; 2 TABLET SUBLINGUAL at 17:45

## 2024-12-01 RX ADMIN — POTASSIUM CHLORIDE 40 MILLIEQUIVALENT(S): 600 TABLET, EXTENDED RELEASE ORAL at 13:53

## 2024-12-01 RX ADMIN — Medication 5000 UNIT(S): at 21:29

## 2024-12-01 RX ADMIN — BUPRENORPHINE AND NALOXONE 1 FILM(S): 8; 2 TABLET SUBLINGUAL at 05:11

## 2024-12-01 RX ADMIN — ACETAMINOPHEN, DIPHENHYDRAMINE HCL, PHENYLEPHRINE HCL 3 MILLIGRAM(S): 325; 25; 5 TABLET ORAL at 21:29

## 2024-12-01 RX ADMIN — MEROPENEM 100 MILLIGRAM(S): 500 INJECTION, POWDER, FOR SOLUTION INTRAVENOUS at 05:10

## 2024-12-01 RX ADMIN — Medication 2 MILLIGRAM(S): at 19:49

## 2024-12-01 RX ADMIN — MEROPENEM 100 MILLIGRAM(S): 500 INJECTION, POWDER, FOR SOLUTION INTRAVENOUS at 13:52

## 2024-12-01 RX ADMIN — MEROPENEM 100 MILLIGRAM(S): 500 INJECTION, POWDER, FOR SOLUTION INTRAVENOUS at 21:28

## 2024-12-01 NOTE — PROGRESS NOTE ADULT - ASSESSMENT
69M, with MHx of L knee replacement, OCD, Panic disorder, Anxiety, Opioid dependence on suboxone (from oxycodone use from back surgery, expected to finish long suboxone taper in 1 year), HTN, who comes in with a sudden onset of bilateral leg swelling that started 3 weeks ago. Admitted for cellulitis +/- colitis    ##Leg swelling   ##Colitis   P/w 1 month of BL leg swelling. LLE swelling is improved. RLE still swollen.    TTE reviewed EF 69%; H2FPEF 49%. BNP 2423. CTAP with colitis   - switch to PO lasix  - Abx changed to IV ewa; F/u cultures pending, NTD  - F/U ID regarding abx duration     #Splenomegaly  -Given splenomegaly and leukocytosis will consult Heme-onc on Monday    #Narcotic dependence   Home med Suboxone (due to oxycodone use from back surgery, expected to finish long suboxone taper in 1 year)  - C/w Suboxone.    #Panic disorder  #OCD   #anxiety   Home med Xanax 2mg TID. Patient says he gets very bad panic attacks so he will not taper off of Xanax  - C/w home med.    #Anemia.   ·  Plan: - Microcytic anemia  - P/w Hgb 9.5 (it was 10.9 in May 2023)  - Possibly iron deficiency anemia  - No sign of bleeding  - Outpt follow up.    #HTN (hypertension).   · hold for now lisinopril 5mg.    Diet: DASH  DVT prophylaxis: [facility]  Dispo: admit   Code Status:

## 2024-12-01 NOTE — PROGRESS NOTE ADULT - SUBJECTIVE AND OBJECTIVE BOX
PROGRESS NOTE:     Patient is a 69y old  Male who presents with a chief complaint of Bilateral leg swelling (30 Nov 2024 14:00)      SUBJECTIVE / OVERNIGHT EVENTS:No acute overnight events. Patient states that stool is becoming more formed. Abd pain has resolved. RLE swelling improving a bit.         MEDICATIONS  (STANDING):  ALPRAZolam 2 milliGRAM(s) Oral <User Schedule>  buprenorphine 8 mG/naloxone 2 mG SL Film 1 Film(s) SubLingual <User Schedule>  furosemide   Injectable 40 milliGRAM(s) IV Push daily  heparin   Injectable 5000 Unit(s) SubCutaneous every 8 hours  influenza  Vaccine (HIGH DOSE) 0.5 milliLiter(s) IntraMuscular once  melatonin 3 milliGRAM(s) Oral at bedtime  meropenem  IVPB 1000 milliGRAM(s) IV Intermittent every 8 hours  potassium chloride    Tablet ER 40 milliEquivalent(s) Oral once    MEDICATIONS  (PRN):  acetaminophen     Tablet .. 650 milliGRAM(s) Oral every 6 hours PRN Temp greater or equal to 38C (100.4F), Mild Pain (1 - 3)      CAPILLARY BLOOD GLUCOSE        I&O's Summary    30 Nov 2024 07:01  -  01 Dec 2024 07:00  --------------------------------------------------------  IN: 690 mL / OUT: 600 mL / NET: 90 mL        PHYSICAL EXAM:  Vital Signs Last 24 Hrs  T(C): 36.7 (01 Dec 2024 10:48), Max: 37.2 (30 Nov 2024 17:06)  T(F): 98.1 (01 Dec 2024 10:48), Max: 98.9 (30 Nov 2024 17:06)  HR: 77 (01 Dec 2024 10:48) (56 - 87)  BP: 138/75 (01 Dec 2024 10:48) (119/70 - 138/75)  BP(mean): --  RR: 18 (01 Dec 2024 10:48) (17 - 20)  SpO2: 98% (01 Dec 2024 10:48) (95% - 100%)    Parameters below as of 01 Dec 2024 10:48  Patient On (Oxygen Delivery Method): room air        PHYSICAL EXAM:  GENERAL: NAD  CHEST/LUNG: Clear to auscultation bilaterally; No wheeze, rhonchi, rales  HEART: Regular rate and rhythm; No murmurs  ABDOMEN: Soft, Nontender, Nondistended; Bowel sounds present  EXTREMITIES: RLE edema. With tenderness, warmth, and erythema below the knee  NEURO: AAOx3, non-focal  SKIN: Past surgical scar on L knee without any erythema or swelling    LABS:                        9.8    17.02 )-----------( 448      ( 01 Dec 2024 06:17 )             33.6     12-01    139  |  103  |  9   ----------------------------<  96  3.3[L]   |  27  |  0.77    Ca    8.9      01 Dec 2024 06:17  Mg     1.7     11-30            Urinalysis Basic - ( 01 Dec 2024 06:17 )    Color: x / Appearance: x / SG: x / pH: x  Gluc: 96 mg/dL / Ketone: x  / Bili: x / Urobili: x   Blood: x / Protein: x / Nitrite: x   Leuk Esterase: x / RBC: x / WBC x   Sq Epi: x / Non Sq Epi: x / Bacteria: x          RADIOLOGY & ADDITIONAL TESTS:  Results Reviewed:   Imaging Personally Reviewed:  Electrocardiogram Personally Reviewed:    COORDINATION OF CARE:  Care Discussed with Consultants/Other Providers [Y/N]:  Prior or Outpatient Records Reviewed [Y/N]:

## 2024-12-02 PROBLEM — F41.0 PANIC DISORDER [EPISODIC PAROXYSMAL ANXIETY]: Chronic | Status: ACTIVE | Noted: 2024-11-24

## 2024-12-02 PROBLEM — F42.9 OBSESSIVE-COMPULSIVE DISORDER, UNSPECIFIED: Chronic | Status: ACTIVE | Noted: 2024-11-24

## 2024-12-02 LAB
ALBUMIN SERPL ELPH-MCNC: 3.3 G/DL — SIGNIFICANT CHANGE UP (ref 3.3–5)
ALP SERPL-CCNC: 120 U/L — SIGNIFICANT CHANGE UP (ref 40–120)
ALT FLD-CCNC: 22 U/L — SIGNIFICANT CHANGE UP (ref 12–78)
ANION GAP SERPL CALC-SCNC: 4 MMOL/L — LOW (ref 5–17)
ANISOCYTOSIS BLD QL: SLIGHT — SIGNIFICANT CHANGE UP
AST SERPL-CCNC: 42 U/L — HIGH (ref 15–37)
BASOPHILS # BLD AUTO: 0 K/UL — SIGNIFICANT CHANGE UP (ref 0–0.2)
BASOPHILS NFR BLD AUTO: 0 % — SIGNIFICANT CHANGE UP (ref 0–2)
BILIRUB SERPL-MCNC: 0.8 MG/DL — SIGNIFICANT CHANGE UP (ref 0.2–1.2)
BUN SERPL-MCNC: 11 MG/DL — SIGNIFICANT CHANGE UP (ref 7–23)
CALCIUM SERPL-MCNC: 9.5 MG/DL — SIGNIFICANT CHANGE UP (ref 8.5–10.1)
CHLORIDE SERPL-SCNC: 104 MMOL/L — SIGNIFICANT CHANGE UP (ref 96–108)
CO2 SERPL-SCNC: 31 MMOL/L — SIGNIFICANT CHANGE UP (ref 22–31)
CREAT SERPL-MCNC: 0.87 MG/DL — SIGNIFICANT CHANGE UP (ref 0.5–1.3)
CULTURE RESULTS: SIGNIFICANT CHANGE UP
CULTURE RESULTS: SIGNIFICANT CHANGE UP
EGFR: 95 ML/MIN/1.73M2 — SIGNIFICANT CHANGE UP
EOSINOPHIL # BLD AUTO: 0.63 K/UL — HIGH (ref 0–0.5)
EOSINOPHIL NFR BLD AUTO: 3 % — SIGNIFICANT CHANGE UP (ref 0–6)
GLUCOSE SERPL-MCNC: 86 MG/DL — SIGNIFICANT CHANGE UP (ref 70–99)
HCT VFR BLD CALC: 36.8 % — LOW (ref 39–50)
HGB BLD-MCNC: 10.4 G/DL — LOW (ref 13–17)
HYPOCHROMIA BLD QL: SLIGHT — SIGNIFICANT CHANGE UP
HYPOSEGMENTATION: PRESENT — SIGNIFICANT CHANGE UP
LYMPHOCYTES # BLD AUTO: 1.26 K/UL — SIGNIFICANT CHANGE UP (ref 1–3.3)
LYMPHOCYTES # BLD AUTO: 6 % — LOW (ref 13–44)
MAGNESIUM SERPL-MCNC: 1.7 MG/DL — SIGNIFICANT CHANGE UP (ref 1.6–2.6)
MANUAL SMEAR VERIFICATION: SIGNIFICANT CHANGE UP
MCHC RBC-ENTMCNC: 22.4 PG — LOW (ref 27–34)
MCHC RBC-ENTMCNC: 28.3 G/DL — LOW (ref 32–36)
MCV RBC AUTO: 79.1 FL — LOW (ref 80–100)
MICROCYTES BLD QL: SLIGHT — SIGNIFICANT CHANGE UP
MONOCYTES # BLD AUTO: 0.42 K/UL — SIGNIFICANT CHANGE UP (ref 0–0.9)
MONOCYTES NFR BLD AUTO: 2 % — SIGNIFICANT CHANGE UP (ref 2–14)
MYELOCYTES NFR BLD: 3 % — HIGH (ref 0–0)
NEUTROPHILS # BLD AUTO: 17.68 K/UL — HIGH (ref 1.8–7.4)
NEUTROPHILS NFR BLD AUTO: 82 % — HIGH (ref 43–77)
NEUTS BAND # BLD: 2 % — SIGNIFICANT CHANGE UP (ref 0–8)
NRBC # BLD: 1 /100 WBCS — HIGH (ref 0–0)
NRBC # BLD: SIGNIFICANT CHANGE UP /100 WBCS (ref 0–0)
OVALOCYTES BLD QL SMEAR: SLIGHT — SIGNIFICANT CHANGE UP
PHOSPHATE SERPL-MCNC: 3.2 MG/DL — SIGNIFICANT CHANGE UP (ref 2.5–4.5)
PLAT MORPH BLD: NORMAL — SIGNIFICANT CHANGE UP
PLATELET # BLD AUTO: 520 K/UL — HIGH (ref 150–400)
POIKILOCYTOSIS BLD QL AUTO: SLIGHT — SIGNIFICANT CHANGE UP
POLYCHROMASIA BLD QL SMEAR: SLIGHT — SIGNIFICANT CHANGE UP
POTASSIUM SERPL-MCNC: 3.5 MMOL/L — SIGNIFICANT CHANGE UP (ref 3.5–5.3)
POTASSIUM SERPL-SCNC: 3.5 MMOL/L — SIGNIFICANT CHANGE UP (ref 3.5–5.3)
PROT SERPL-MCNC: 8.1 GM/DL — SIGNIFICANT CHANGE UP (ref 6–8.3)
RBC # BLD: 4.65 M/UL — SIGNIFICANT CHANGE UP (ref 4.2–5.8)
RBC # FLD: 22.3 % — HIGH (ref 10.3–14.5)
RBC BLD AUTO: ABNORMAL
SODIUM SERPL-SCNC: 139 MMOL/L — SIGNIFICANT CHANGE UP (ref 135–145)
SPECIMEN SOURCE: SIGNIFICANT CHANGE UP
SPECIMEN SOURCE: SIGNIFICANT CHANGE UP
VARIANT LYMPHS # BLD: 2 % — SIGNIFICANT CHANGE UP (ref 0–6)
WBC # BLD: 21.05 K/UL — HIGH (ref 3.8–10.5)
WBC # FLD AUTO: 21.05 K/UL — HIGH (ref 3.8–10.5)

## 2024-12-02 PROCEDURE — 99232 SBSQ HOSP IP/OBS MODERATE 35: CPT

## 2024-12-02 RX ORDER — BUPRENORPHINE AND NALOXONE 8; 2 MG/1; MG/1
1 TABLET SUBLINGUAL
Refills: 0 | Status: DISCONTINUED | OUTPATIENT
Start: 2024-12-02 | End: 2024-12-05

## 2024-12-02 RX ADMIN — MEROPENEM 100 MILLIGRAM(S): 500 INJECTION, POWDER, FOR SOLUTION INTRAVENOUS at 22:28

## 2024-12-02 RX ADMIN — Medication 2 MILLIGRAM(S): at 11:34

## 2024-12-02 RX ADMIN — Medication 5000 UNIT(S): at 22:28

## 2024-12-02 RX ADMIN — BUPRENORPHINE AND NALOXONE 1 FILM(S): 8; 2 TABLET SUBLINGUAL at 05:19

## 2024-12-02 RX ADMIN — FUROSEMIDE 20 MILLIGRAM(S): 40 TABLET ORAL at 05:19

## 2024-12-02 RX ADMIN — MEROPENEM 100 MILLIGRAM(S): 500 INJECTION, POWDER, FOR SOLUTION INTRAVENOUS at 05:19

## 2024-12-02 RX ADMIN — MEROPENEM 100 MILLIGRAM(S): 500 INJECTION, POWDER, FOR SOLUTION INTRAVENOUS at 14:51

## 2024-12-02 RX ADMIN — Medication 5000 UNIT(S): at 05:19

## 2024-12-02 RX ADMIN — ACETAMINOPHEN, DIPHENHYDRAMINE HCL, PHENYLEPHRINE HCL 3 MILLIGRAM(S): 325; 25; 5 TABLET ORAL at 22:28

## 2024-12-02 RX ADMIN — BUPRENORPHINE AND NALOXONE 1 FILM(S): 8; 2 TABLET SUBLINGUAL at 18:09

## 2024-12-02 RX ADMIN — Medication 5000 UNIT(S): at 14:40

## 2024-12-02 RX ADMIN — Medication 2 MILLIGRAM(S): at 05:23

## 2024-12-02 RX ADMIN — Medication 2 MILLIGRAM(S): at 20:29

## 2024-12-02 RX ADMIN — ACETAMINOPHEN 500MG 650 MILLIGRAM(S): 500 TABLET, COATED ORAL at 00:44

## 2024-12-02 NOTE — PROGRESS NOTE ADULT - ASSESSMENT
Patient is a 70 y/o man, with PMHx of L knee replacement, OCD, panic disorder, anxiety, opioid dependence-on Suboxone, and HTN, who comes in with a sudden onset of bilateral leg swelling that started 3 weeks ago. He woke up one day in the morning and noticed his legs were swollen. He went to his PCP Dr. Dr. Toney and was prescribed Lasix 20mg, which did not help. Patient denies headache, fever, chills, nausea, vomit, chest pain, shortness of breath, cough, lightheadedness, abdominal pain, diarrhea, constipation, dark/bloody stool, dysuria, hematuria, loss of strength, or loss of sensation. (24 Nov 2024 20:25)  ID consulted for workup and antibiotic management   Seen and examined at bedside. He is afebrile. Says no fever at home. Does not recall any triggering event, fall or trauma. Has pet cat that scratched his leg recently without any deep puncture wound. No recent antibiotics use. He has not had skin infections before. 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 He is not diabetic. He has no known allergies. Work up notable for leukocytosis, ESR: 29, CRP:47. US doppler without DVT. CT leg with soft tissue swelling and no fluid collection.    11/26: continues having fevers 102.3 today, RA, WBC increased 22.16, Cr elevated 1.66, BCs NGTD, MRSA PCR negative. Abx were changed by medicine to Clindamycin.  11/27: spiked a fever yesterday 102.3, no fevers today, RA, WBC with mild improvement 20.53, Cr normalized, BCs NGTD, two more sets were collected due to yesterday's fever, Clindamycin IV continued for now.    11/29: Afebrile today. T-max 100.6 last night. WBC trended down to 15k. CT A/P with splenomegaly and colitis of rectosigmoid and descending colon. Would stop clinda and start meropenem today.  12/2:     Impression:  1. Right LE cellulitis  2. Bilateral LE edema  3. Leukocytosis and fever spikes  4. Colitis  5. Splenomegaly  6. Hx of L knee replacement    Recommendations:  --On meropenem 1 g IV q8h Day # 4   --Consider hem/onc evaluation for splenomegaly and leukocytosis  --Leg elevation and warm compresses  --Trend temp curve and monitor white counts  --Stool count if having diarrhea   --Probiotics    Discussed with Dr. Lou Cota MD  Attending Physician  Division of Infectious Diseases   Available via [employer] Teams   Patient is a 70 y/o man, with PMHx of L knee replacement, OCD, panic disorder, anxiety, opioid dependence-on Suboxone, and HTN, who comes in with a sudden onset of bilateral leg swelling that started 3 weeks ago. He woke up one day in the morning and noticed his legs were swollen. He went to his PCP Dr. Dr. Toney and was prescribed Lasix 20mg, which did not help. Patient denies headache, fever, chills, nausea, vomit, chest pain, shortness of breath, cough, lightheadedness, abdominal pain, diarrhea, constipation, dark/bloody stool, dysuria, hematuria, loss of strength, or loss of sensation. (24 Nov 2024 20:25)  ID consulted for workup and antibiotic management   Seen and examined at bedside. He is afebrile. Says no fever at home. Does not recall any triggering event, fall or trauma. Has pet cat that scratched his leg recently without any deep puncture wound. No recent antibiotics use. He has not had skin infections before. 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 He has no known allergies. Work up notable for leukocytosis, ESR: 29, CRP:47. US doppler without DVT. CT leg with soft tissue swelling and no fluid collection.    11/26: continues having fevers 102.3 today, RA, WBC increased 22.16, Cr elevated 1.66, BCs NGTD, MRSA PCR negative. Abx were changed by medicine to Clindamycin.  11/27: spiked a fever yesterday 102.3, no fevers today, RA, WBC with mild improvement 20.53, Cr normalized, BCs NGTD, two more sets were collected due to yesterday's fever, Clindamycin IV continued for now.    11/29: Afebrile today. T-max 100.6 last night. WBC trended down to 15k. CT A/P with splenomegaly and colitis of rectosigmoid and descending colon. Would stop clinda and start meropenem today.  12/2: Afebrile. No new fevers. Cellulitis and colitis seem to have improved. His WBC counts remain elevated. He has huge spleen on his CT on my review. Would have hem/onc eval. Will likely stop antibiotics soon.    Impression:  1. Right LE cellulitis  2. Bilateral LE edema  3. Leukocytosis and fever spikes  4. Colitis  5. Splenomegaly  6. Hx of L knee replacement    Recommendations:  --On meropenem 1 g IV q8h Day # 4   --Hem/onc evaluation for splenomegaly and leukocytosis  --Leg elevation and warm compresses  --Trend temp curve and monitor white counts  --Stool count if having diarrhea   --Probiotics    Discussed with medical PA    Lou Cota MD  Attending Physician  Division of Infectious Diseases   Available via [employer] Teams

## 2024-12-02 NOTE — PROGRESS NOTE ADULT - SUBJECTIVE AND OBJECTIVE BOX
Jerald Serna M.D.    Patient is a 69y old  Male who presents with a chief complaint of Bilateral leg swelling (02 Dec 2024 12:06)      SUBJECTIVE / OVERNIGHT EVENTS: no concerns.     Patient denies chest pain, SOB, abd pain, N/V, fever, chills, dysuria or any other complaints. All remainder ROS negative.     MEDICATIONS  (STANDING):  ALPRAZolam 2 milliGRAM(s) Oral <User Schedule>  buprenorphine 8 mG/naloxone 2 mG SL Film 1 Film(s) SubLingual two times a day  furosemide    Tablet 20 milliGRAM(s) Oral daily  heparin   Injectable 5000 Unit(s) SubCutaneous every 8 hours  influenza  Vaccine (HIGH DOSE) 0.5 milliLiter(s) IntraMuscular once  melatonin 3 milliGRAM(s) Oral at bedtime  meropenem  IVPB 1000 milliGRAM(s) IV Intermittent every 8 hours    MEDICATIONS  (PRN):  acetaminophen     Tablet .. 650 milliGRAM(s) Oral every 6 hours PRN Temp greater or equal to 38C (100.4F), Mild Pain (1 - 3)      I&O's Summary    01 Dec 2024 07:01  -  02 Dec 2024 07:00  --------------------------------------------------------  IN: 390 mL / OUT: 0 mL / NET: 390 mL        PHYSICAL EXAM:  Vital Signs Last 24 Hrs  T(C): 36.6 (02 Dec 2024 11:30), Max: 36.7 (01 Dec 2024 23:22)  T(F): 97.9 (02 Dec 2024 11:30), Max: 98 (01 Dec 2024 23:22)  HR: 73 (02 Dec 2024 11:30) (68 - 76)  BP: 157/86 (02 Dec 2024 11:30) (130/70 - 157/86)  BP(mean): --  RR: 18 (02 Dec 2024 11:30) (17 - 20)  SpO2: 94% (02 Dec 2024 11:30) (94% - 98%)    Parameters below as of 02 Dec 2024 11:30  Patient On (Oxygen Delivery Method): room air        CONSTITUTIONAL: NAD, well-groomed  ENMT: Moist oral mucosa, no pharyngeal injection or exudates; normal dentition  RESPIRATORY: Normal respiratory effort; lungs are clear to auscultation bilaterally  CARDIOVASCULAR: Regular rate and rhythm, normal S1 and S2, no murmur/rub/gallop; No lower extremity edema; Peripheral pulses are 2+ bilaterally  ABDOMEN: Nontender to palpation, normoactive bowel sounds, no rebound/guarding; No hepatosplenomegaly  MUSCLOSKELETAL:  Normal gait; no clubbing or cyanosis of digits; no joint swelling or tenderness to palpation  PSYCH: A+O x3; affect appropriate  NEUROLOGY: CN 2-12 are intact and symmetric; no gross sensory deficits;   SKIN: No rashes; no palpable lesions    LABS:                        10.4   21.05 )-----------( 520      ( 02 Dec 2024 06:45 )             36.8     12-02    139  |  104  |  11  ----------------------------<  86  3.5   |  31  |  0.87    Ca    9.5      02 Dec 2024 06:45  Phos  3.2     12-02  Mg     1.7     12-02    TPro  8.1  /  Alb  3.3  /  TBili  0.8  /  DBili  x   /  AST  42[H]  /  ALT  22  /  AlkPhos  120  12-02          Urinalysis Basic - ( 02 Dec 2024 06:45 )    Color: x / Appearance: x / SG: x / pH: x  Gluc: 86 mg/dL / Ketone: x  / Bili: x / Urobili: x   Blood: x / Protein: x / Nitrite: x   Leuk Esterase: x / RBC: x / WBC x   Sq Epi: x / Non Sq Epi: x / Bacteria: x        CAPILLARY BLOOD GLUCOSE          RADIOLOGY & ADDITIONAL TESTS:  Results Reviewed:   Imaging Personally Reviewed:  Electrocardiogram Personally Reviewed:

## 2024-12-02 NOTE — PROGRESS NOTE ADULT - SUBJECTIVE AND OBJECTIVE BOX
JUAQUIN JOHNSON  [id number]  69y (1955)    Follow Up:  Fever, cellulitis, leukocytosis     Interval History: Seen and examined at bedside. He is afebrile.       ROS:    [ ] Unobtainable because:  [x ] All other systems negative    Constitutional: no fever, no chills  Head: no trauma  Eyes: no vision changes, no eye pain  ENT:  no sore throat, no rhinorrhea  Cardiovascular:  no chest pain, no palpitation  Respiratory:  no SOB, no cough  GI:  no abd pain, no vomiting, no diarrhea  urinary: no dysuria, no hematuria, no flank pain  musculoskeletal:  no joint pain, no joint swelling  skin:  no rash  neurology:  no headache, no seizure, no change in mental status  psych: no anxiety, no depression         Allergies  No Known Allergies        ANTIMICROBIALS:    Meropenem 11/29-->      Physical Exam:      Constitutional: Pleasant man, non-toxic, no distress  HEAD/EYES: anicteric, no conjunctival injection  ENT:  supple, no thrush  Cardiovascular:   normal S1, S2, no murmur   Respiratory:  clear BS bilaterally, no wheezes, no rales  GI:  soft, mild left sided tenderness, normal bowel sounds, not distended   :  no corley, no CVA tenderness  Musculoskeletal:  Bilateral LE erythema and swelling significantly improved   Neurologic: awake and alert, normal strength, no focal findings  Skin:  Bilateral onychomycosis  Heme/Onc: no lymphadenopathy   Psychiatric:  awake, alert, appropriate mood    WBC Count: 15.07 K/uL (11-29 @ 06:59)  WBC Count: 19.17 K/uL (11-28 @ 06:15)  WBC Count: 20.53 K/uL (11-27 @ 06:20)  WBC Count: 22.16 K/uL (11-26 @ 08:03)  WBC Count: 21.25 K/uL (11-25 @ 05:55)  WBC Count: 17.14 K/uL (11-24 @ 15:15)                          8.6    15.07 )-----------( 351      ( 29 Nov 2024 06:59 )             29.0   11-29    138  |  106  |  12  ----------------------------<  99  3.8   |  25  |  0.82    Ca    8.6      29 Nov 2024 06:59  Mg     1.9     11-29        Urinalysis Basic - ( 27 Nov 2024 06:20 )    Color: x / Appearance: x / SG: x / pH: x  Gluc: 109 mg/dL / Ketone: x  / Bili: x / Urobili: x   Blood: x / Protein: x / Nitrite: x   Leuk Esterase: x / RBC: x / WBC x   Sq Epi: x / Non Sq Epi: x / Bacteria: x        Creatinine Trend: 1.05<--, 1.66<--, 1.17<--, 1.20<--      MICROBIOLOGY:  .Blood BLOOD  11-24-24   No growth at 48 Hours  --  --      .Blood BLOOD  11-24-24   No growth at 48 Hours  --  --    C-Reactive Protein: 47 (11-24)      RADIOLOGY:  < from: CT Abdomen and Pelvis No Cont (11.28.24 @ 12:35) >  IMPRESSION:  Colitis of the rectosigmoid and descending colon.  Splenomegaly.    < end of copied text >    < from: CT Lower Extremity w/ IV Cont, Bilateral (11.25.24 @ 01:29) >  IMPRESSION:  Marked soft tissue edema seen surrounding the bilateral lower   extremities. No mature, drainable, or enhancing collection is seen at   this time.    < end of copied text >    < from: US Duplex Venous Lower Ext Complete, Bilateral (11.24.24 @ 16:55) >  IMPRESSION:  Calf veins not well seen bilaterally with no thrombus in the visualized   left posterior tibial vein.  No evidence of deep venous thrombosis in the common femoral, femoral, and   popliteal veins bilaterally.    < end of copied text >     JUAQUIN JOHNSON  MRN-[id number]  69y (1955)    Follow Up:  Fever, cellulitis, leukocytosis     Interval History: Seen and examined at bedside. He is afebrile. Says his abdominal pain is resolved. Bilateral leg swelling has improved. He has mild diarrhea after every meal. Spoke with patient's wife Loni over the phone in detail.      ROS:    [ ] Unobtainable because:  [x ] All other systems negative    Constitutional: no fever, no chills  Head: no trauma  Eyes: no vision changes, no eye pain  ENT:  no sore throat, no rhinorrhea  Cardiovascular:  no chest pain, no palpitation  Respiratory:  no SOB, no cough  GI:  no abd pain, no vomiting, no diarrhea  urinary: no dysuria, no hematuria, no flank pain  musculoskeletal:  no joint pain, no joint swelling  skin:  no rash  neurology:  no headache, no seizure, no change in mental status  psych: no anxiety, no depression         Allergies  No Known Allergies        ANTIMICROBIALS:    Meropenem 11/29-->      Physical Exam:      Constitutional: Pleasant man, non-toxic, no distress  HEAD/EYES: anicteric, no conjunctival injection  ENT:  supple, no thrush  Cardiovascular:   normal S1, S2, no murmur   Respiratory:  clear BS bilaterally, no wheezes, no rales  GI:  soft, mild left sided tenderness, normal bowel sounds, not distended   :  no corley, no CVA tenderness  Musculoskeletal:  Bilateral LE erythema and swelling significantly improved   Neurologic: awake and alert, normal strength, no focal findings  Skin:  Bilateral onychomycosis  Heme/Onc: no lymphadenopathy   Psychiatric:  awake, alert, appropriate mood    WBC Count: 21.05 K/uL (12-02 @ 06:45)  WBC Count: 17.02 K/uL (12-01 @ 06:17)  WBC Count: 14.87 K/uL (11-30 @ 05:25)  WBC Count: 15.07 K/uL (11-29 @ 06:59)  WBC Count: 19.17 K/uL (11-28 @ 06:15)  WBC Count: 20.53 K/uL (11-27 @ 06:20)  WBC Count: 22.16 K/uL (11-26 @ 08:03)                        10.4   21.05 )-----------( 520      ( 02 Dec 2024 06:45 )             36.8     12-02    139  |  104  |  11  ----------------------------<  86  3.5   |  31  |  0.87    Ca    9.5      02 Dec 2024 06:45  Phos  3.2     12-02  Mg     1.7     12-02    TPro  8.1  /  Alb  3.3  /  TBili  0.8  /  DBili  x   /  AST  42[H]  /  ALT  22  /  AlkPhos  120  12-02      Urinalysis Basic - ( 27 Nov 2024 06:20 )    Color: x / Appearance: x / SG: x / pH: x  Gluc: 109 mg/dL / Ketone: x  / Bili: x / Urobili: x   Blood: x / Protein: x / Nitrite: x   Leuk Esterase: x / RBC: x / WBC x   Sq Epi: x / Non Sq Epi: x / Bacteria: x      MICROBIOLOGY:  .Blood BLOOD  11-24-24   No growth at 48 Hours  --  --      .Blood BLOOD  11-24-24   No growth at 48 Hours  --  --    C-Reactive Protein: 47 (11-24)      RADIOLOGY:  < from: CT Abdomen and Pelvis No Cont (11.28.24 @ 12:35) >  IMPRESSION:  Colitis of the rectosigmoid and descending colon.  Splenomegaly.    < end of copied text >    < from: CT Lower Extremity w/ IV Cont, Bilateral (11.25.24 @ 01:29) >  IMPRESSION:  Marked soft tissue edema seen surrounding the bilateral lower   extremities. No mature, drainable, or enhancing collection is seen at   this time.    < end of copied text >    < from: US Duplex Venous Lower Ext Complete, Bilateral (11.24.24 @ 16:55) >  IMPRESSION:  Calf veins not well seen bilaterally with no thrombus in the visualized   left posterior tibial vein.  No evidence of deep venous thrombosis in the common femoral, femoral, and   popliteal veins bilaterally.    < end of copied text >

## 2024-12-02 NOTE — CHART NOTE - NSCHARTNOTEFT_GEN_A_CORE
Patient seen for length of stay nutrition risk rescreening. Patient has been screened for and presents negative for:    -Pressure ulcers stage 2 or greater  -Enteral or Parenteral Nutrition  -BMI <18  -[id number] >8  -Chewing/Swallowing Difficulty  -Decreased appetite  -Unintentional Weight Loss  -Inadequate diet (i.e. NPO/Clear Liquids)    Pt presented c b/l LE swelling x 3 weeks PTA; positive for cellulitis; placed on IV antibiotics & Lasix  Pt is eating well; no physical signs of malnutrition observed    No nutritional intervention required at this time. RD remains available.

## 2024-12-02 NOTE — PROGRESS NOTE ADULT - ASSESSMENT
69M, with MHx of L knee replacement, OCD, Panic disorder, Anxiety, Opioid dependence on suboxone (from oxycodone use from back surgery, expected to finish long suboxone taper in 1 year), HTN, who comes in with a sudden onset of bilateral leg swelling that started 3 weeks ago. Admitted for cellulitis +/- colitis    ##Leg swelling   ##Colitis   P/w 1 month of BL leg swelling. LLE swelling is improved. RLE still swollen.   TTE reviewed EF 69%; H2FPEF 49%. BNP 2423. CTAP with colitis   switch to PO lasix  Abx changed to IV ewa; F/u cultures pending, NTD  F/U ID regarding abx duration     #Splenomegaly  Given splenomegaly and leukocytosis, asked PA to consult heme/onc, pending recs    #Narcotic dependence   Home med Suboxone (due to oxycodone use from back surgery, expected to finish long suboxone taper in 1 year)  C/w Suboxone.    #Panic disorder  #OCD   #anxiety   Home med Xanax 2mg TID. Patient says he gets very bad panic attacks so he will not taper off of Xanax  - C/w home med.    #Anemia.   Microcytic anemia  P/w Hgb 9.5 (it was 10.9 in May 2023)  Possibly iron deficiency anemia  No sign of bleeding  Outpt follow up.    #HTN (hypertension).   hold for now lisinopril 5mg.    Diet: DASH  DVT prophylaxis: SQH  Dispo: pending ID and heme/onc recs  Code Status:

## 2024-12-03 ENCOUNTER — APPOINTMENT (OUTPATIENT)
Dept: HEMATOLOGY ONCOLOGY | Facility: CLINIC | Age: 69
End: 2024-12-03

## 2024-12-03 LAB
ANION GAP SERPL CALC-SCNC: 6 MMOL/L — SIGNIFICANT CHANGE UP (ref 5–17)
BASOPHILS # BLD AUTO: 0.32 K/UL — HIGH (ref 0–0.2)
BASOPHILS NFR BLD AUTO: 1.7 % — SIGNIFICANT CHANGE UP (ref 0–2)
BUN SERPL-MCNC: 12 MG/DL — SIGNIFICANT CHANGE UP (ref 7–23)
CALCIUM SERPL-MCNC: 9.2 MG/DL — SIGNIFICANT CHANGE UP (ref 8.5–10.1)
CHLORIDE SERPL-SCNC: 105 MMOL/L — SIGNIFICANT CHANGE UP (ref 96–108)
CO2 SERPL-SCNC: 30 MMOL/L — SIGNIFICANT CHANGE UP (ref 22–31)
CREAT SERPL-MCNC: 0.66 MG/DL — SIGNIFICANT CHANGE UP (ref 0.5–1.3)
EGFR: 103 ML/MIN/1.73M2 — SIGNIFICANT CHANGE UP
EOSINOPHIL # BLD AUTO: 0.53 K/UL — HIGH (ref 0–0.5)
EOSINOPHIL NFR BLD AUTO: 2.7 % — SIGNIFICANT CHANGE UP (ref 0–6)
GLUCOSE SERPL-MCNC: 95 MG/DL — SIGNIFICANT CHANGE UP (ref 70–99)
HCT VFR BLD CALC: 31.1 % — LOW (ref 39–50)
HGB BLD-MCNC: 9.2 G/DL — LOW (ref 13–17)
IMM GRANULOCYTES NFR BLD AUTO: 5.7 % — HIGH (ref 0–0.9)
LYMPHOCYTES # BLD AUTO: 1.99 K/UL — SIGNIFICANT CHANGE UP (ref 1–3.3)
LYMPHOCYTES # BLD AUTO: 10.3 % — LOW (ref 13–44)
MCHC RBC-ENTMCNC: 22.9 PG — LOW (ref 27–34)
MCHC RBC-ENTMCNC: 29.6 G/DL — LOW (ref 32–36)
MCV RBC AUTO: 77.4 FL — LOW (ref 80–100)
MONOCYTES # BLD AUTO: 0.99 K/UL — HIGH (ref 0–0.9)
MONOCYTES NFR BLD AUTO: 5.1 % — SIGNIFICANT CHANGE UP (ref 2–14)
NEUTROPHILS # BLD AUTO: 14.43 K/UL — HIGH (ref 1.8–7.4)
NEUTROPHILS NFR BLD AUTO: 74.5 % — SIGNIFICANT CHANGE UP (ref 43–77)
NRBC # BLD: 0 /100 WBCS — SIGNIFICANT CHANGE UP (ref 0–0)
PLATELET # BLD AUTO: 408 K/UL — HIGH (ref 150–400)
POTASSIUM SERPL-MCNC: 3.8 MMOL/L — SIGNIFICANT CHANGE UP (ref 3.5–5.3)
POTASSIUM SERPL-SCNC: 3.8 MMOL/L — SIGNIFICANT CHANGE UP (ref 3.5–5.3)
RBC # BLD: 4.02 M/UL — LOW (ref 4.2–5.8)
RBC # FLD: 22 % — HIGH (ref 10.3–14.5)
SODIUM SERPL-SCNC: 141 MMOL/L — SIGNIFICANT CHANGE UP (ref 135–145)
WBC # BLD: 19.37 K/UL — HIGH (ref 3.8–10.5)
WBC # FLD AUTO: 19.37 K/UL — HIGH (ref 3.8–10.5)

## 2024-12-03 PROCEDURE — 99231 SBSQ HOSP IP/OBS SF/LOW 25: CPT

## 2024-12-03 PROCEDURE — 74177 CT ABD & PELVIS W/CONTRAST: CPT | Mod: 26

## 2024-12-03 PROCEDURE — 71250 CT THORAX DX C-: CPT | Mod: 26

## 2024-12-03 PROCEDURE — 99232 SBSQ HOSP IP/OBS MODERATE 35: CPT

## 2024-12-03 RX ORDER — ALPRAZOLAM 0.5 MG
2 TABLET ORAL
Refills: 0 | Status: DISCONTINUED | OUTPATIENT
Start: 2024-12-03 | End: 2024-12-05

## 2024-12-03 RX ORDER — ALPRAZOLAM 0.5 MG
2 TABLET ORAL ONCE
Refills: 0 | Status: DISCONTINUED | OUTPATIENT
Start: 2024-12-03 | End: 2024-12-03

## 2024-12-03 RX ORDER — IOHEXOL 300 MG/ML
30 INJECTION, SOLUTION INTRAVENOUS ONCE
Refills: 0 | Status: COMPLETED | OUTPATIENT
Start: 2024-12-03 | End: 2024-12-03

## 2024-12-03 RX ADMIN — Medication 5000 UNIT(S): at 06:39

## 2024-12-03 RX ADMIN — Medication 2 MILLIGRAM(S): at 07:03

## 2024-12-03 RX ADMIN — Medication 2 MILLIGRAM(S): at 20:41

## 2024-12-03 RX ADMIN — BUPRENORPHINE AND NALOXONE 1 FILM(S): 8; 2 TABLET SUBLINGUAL at 17:27

## 2024-12-03 RX ADMIN — ACETAMINOPHEN, DIPHENHYDRAMINE HCL, PHENYLEPHRINE HCL 3 MILLIGRAM(S): 325; 25; 5 TABLET ORAL at 23:00

## 2024-12-03 RX ADMIN — MEROPENEM 100 MILLIGRAM(S): 500 INJECTION, POWDER, FOR SOLUTION INTRAVENOUS at 23:02

## 2024-12-03 RX ADMIN — MEROPENEM 100 MILLIGRAM(S): 500 INJECTION, POWDER, FOR SOLUTION INTRAVENOUS at 06:39

## 2024-12-03 RX ADMIN — IOHEXOL 30 MILLILITER(S): 300 INJECTION, SOLUTION INTRAVENOUS at 19:08

## 2024-12-03 RX ADMIN — Medication 5000 UNIT(S): at 23:00

## 2024-12-03 RX ADMIN — BUPRENORPHINE AND NALOXONE 1 FILM(S): 8; 2 TABLET SUBLINGUAL at 06:39

## 2024-12-03 RX ADMIN — Medication 5000 UNIT(S): at 17:30

## 2024-12-03 RX ADMIN — MEROPENEM 100 MILLIGRAM(S): 500 INJECTION, POWDER, FOR SOLUTION INTRAVENOUS at 14:28

## 2024-12-03 RX ADMIN — Medication 2 MILLIGRAM(S): at 12:34

## 2024-12-03 RX ADMIN — FUROSEMIDE 20 MILLIGRAM(S): 40 TABLET ORAL at 06:39

## 2024-12-03 NOTE — PROGRESS NOTE ADULT - ASSESSMENT
69M, with MHx of L knee replacement, OCD, Panic disorder, Anxiety, Opioid dependence on suboxone (from oxycodone use from back surgery, expected to finish long suboxone taper in 1 year), HTN, who comes in with a sudden onset of bilateral leg swelling that started 3 weeks ago. Admitted for cellulitis +/- colitis    ##Leg swelling   ##Colitis   P/w 1 month of BL leg swelling. LLE swelling is improved. RLE still swollen.   TTE reviewed EF 69%; H2FPEF 49%. BNP 2423. CTAP with colitis   switch to PO lasix  Abx changed to IV ewa  BCx NGTD  F/U ID regarding abx duration     #Splenomegaly  Given splenomegaly and leukocytosis, asked PA to consult heme/onc, pending recs    #Narcotic dependence   Home med Suboxone (due to oxycodone use from back surgery, expected to finish long suboxone taper in 1 year)  C/w Suboxone.    #Panic disorder  #OCD   #anxiety   Home med Xanax 2mg TID. Patient says he gets very bad panic attacks so he will not taper off of Xanax  - C/w home med.    #Anemia.   Microcytic anemia  P/w Hgb 9.5 (it was 10.9 in May 2023)  Possibly iron deficiency anemia  No sign of bleeding  Outpt follow up.    #HTN (hypertension).   hold for now lisinopril 5mg.    Diet: DASH  DVT prophylaxis: SQH  Dispo: pending ID and heme/onc recs  Code Status:     69M, with MHx of L knee replacement, OCD, Panic disorder, Anxiety, Opioid dependence on suboxone (from oxycodone use from back surgery, expected to finish long suboxone taper in 1 year), HTN, who comes in with a sudden onset of bilateral leg swelling that started 3 weeks ago. Admitted for cellulitis +/- colitis    ##Leg swelling   ##Colitis   P/w 1 month of BL leg swelling. LLE swelling is improved. RLE still swollen.   TTE reviewed EF 69%; H2FPEF 49%. BNP 2423. CTAP with colitis   switch to PO lasix  Abx changed to IV ewa  BCx NGTD  D/w ID, plan for 5 days total of ewa (last day 12/3 evening)    #Splenomegaly  Given splenomegaly and leukocytosis, asked PA to consult heme/onc, pending recs    #Narcotic dependence   Home med Suboxone (due to oxycodone use from back surgery, expected to finish long suboxone taper in 1 year)  C/w Suboxone.    #Panic disorder  #OCD   #anxiety   Home med Xanax 2mg TID. Patient says he gets very bad panic attacks so he will not taper off of Xanax  - C/w home med.    #Anemia.   Microcytic anemia  P/w Hgb 9.5 (it was 10.9 in May 2023)  Possibly iron deficiency anemia  No sign of bleeding  Outpt follow up.    #HTN (hypertension).   hold for now lisinopril 5mg.    Diet: DASH  DVT prophylaxis: SQH  Dispo: pending heme/onc recs and completion of Ewa (last dose 12/3 evening)  Code Status:

## 2024-12-03 NOTE — PROGRESS NOTE ADULT - SUBJECTIVE AND OBJECTIVE BOX
Jerald Serna M.D.    Patient is a 69y old  Male who presents with a chief complaint of Bilateral leg swelling (03 Dec 2024 11:43)      SUBJECTIVE / OVERNIGHT EVENTS: no concerns.     Patient denies chest pain, SOB, abd pain, N/V, fever, chills, dysuria or any other complaints. All remainder ROS negative.     MEDICATIONS  (STANDING):  ALPRAZolam 2 milliGRAM(s) Oral <User Schedule>  buprenorphine 8 mG/naloxone 2 mG SL Film 1 Film(s) SubLingual two times a day  furosemide    Tablet 20 milliGRAM(s) Oral daily  heparin   Injectable 5000 Unit(s) SubCutaneous every 8 hours  influenza  Vaccine (HIGH DOSE) 0.5 milliLiter(s) IntraMuscular once  melatonin 3 milliGRAM(s) Oral at bedtime  meropenem  IVPB 1000 milliGRAM(s) IV Intermittent every 8 hours    MEDICATIONS  (PRN):  acetaminophen     Tablet .. 650 milliGRAM(s) Oral every 6 hours PRN Temp greater or equal to 38C (100.4F), Mild Pain (1 - 3)      I&O's Summary    02 Dec 2024 07:01  -  03 Dec 2024 07:00  --------------------------------------------------------  IN: 240 mL / OUT: 0 mL / NET: 240 mL        PHYSICAL EXAM:  Vital Signs Last 24 Hrs  T(C): 36.7 (03 Dec 2024 12:11), Max: 36.7 (03 Dec 2024 12:11)  T(F): 98 (03 Dec 2024 12:11), Max: 98 (03 Dec 2024 12:11)  HR: 74 (03 Dec 2024 12:11) (68 - 74)  BP: 154/91 (03 Dec 2024 12:11) (130/75 - 154/91)  BP(mean): --  RR: 17 (03 Dec 2024 12:11) (16 - 18)  SpO2: 98% (03 Dec 2024 12:11) (95% - 98%)    Parameters below as of 03 Dec 2024 12:11  Patient On (Oxygen Delivery Method): room air        CONSTITUTIONAL: NAD, well-groomed  ENMT: Moist oral mucosa, no pharyngeal injection or exudates; normal dentition  RESPIRATORY: Normal respiratory effort; lungs are clear to auscultation bilaterally  CARDIOVASCULAR: Regular rate and rhythm, normal S1 and S2, no LE edema  ABDOMEN: Nontender to palpation, normoactive bowel sounds,   PSYCH: A+O x3; affect appropriate  NEUROLOGY: CN 2-12 are intact and symmetric; no gross sensory deficits;   SKIN: Mild warmth of bl LE, with minimal erythema to midshin         LABS:                        9.2    19.37 )-----------( 408      ( 03 Dec 2024 07:08 )             31.1     12-03    141  |  105  |  12  ----------------------------<  95  3.8   |  30  |  0.66    Ca    9.2      03 Dec 2024 07:08  Phos  3.2     12-02  Mg     1.7     12-02    TPro  8.1  /  Alb  3.3  /  TBili  0.8  /  DBili  x   /  AST  42[H]  /  ALT  22  /  AlkPhos  120  12-02          Urinalysis Basic - ( 03 Dec 2024 07:08 )    Color: x / Appearance: x / SG: x / pH: x  Gluc: 95 mg/dL / Ketone: x  / Bili: x / Urobili: x   Blood: x / Protein: x / Nitrite: x   Leuk Esterase: x / RBC: x / WBC x   Sq Epi: x / Non Sq Epi: x / Bacteria: x        CAPILLARY BLOOD GLUCOSE          RADIOLOGY & ADDITIONAL TESTS:  Results Reviewed:   Imaging Personally Reviewed:  Electrocardiogram Personally Reviewed:

## 2024-12-03 NOTE — CONSULT NOTE ADULT - SUBJECTIVE AND OBJECTIVE BOX
69 year old male       He has slight reddish discoloration in both legs at current time      Review of systems:  No fever, chills, night sweats  Romelia: no mouth sores  Pulm: no cough, shortness of breath  CV- no chest pain  Abd- no nausea, vomiting. Mostly constipated, no abd pain.   Ext: no edema in legs today  Lymphatics: no swollen lymph nodes  Neuro: no paresthesia  Pain: 0 69 year old male with PMHx of L knee replacement, OCD, Panic disorder, Anxiety, Opioid dependence on suboxone (from oxycodone use from back surgery, expected to finish long suboxone taper in 1 year), HTN, who comes in with a sudden onset of bilateral leg swelling that started 3 weeks ago. He woke up one day in the morning and noticed his legs were swollen. He went to his PCP Dr. Dr. Toney and was prescribed Lasix 20mg, which did not help. Patient denies headache, fever, chills, nausea, vomit, chest pain, shortness of breath, cough, lightheadedness, abdominal pain, diarrhea, constipation, dark/bloody stool, dysuria, hematuria, loss of strength, or loss of sensation.    He denies prior history of lower extremity cellulitis, diabetes, hematology disorders, cancers.     He has slight reddish discoloration in both legs at current time      Review of systems:  No fever, chills, night sweats  Romelia: no mouth sores  Pulm: no cough, shortness of breath  CV- no chest pain  Abd- no nausea, vomiting. Mostly constipated, no abd pain.   Ext: no edema in legs today  Lymphatics: no swollen lymph nodes  Neuro: no paresthesia  Pain: 0 69 year old male with PMHx of L knee replacement, OCD, Panic disorder, Anxiety, Opioid dependence on suboxone (from oxycodone use from back surgery, expected to finish long suboxone taper in 1 year), HTN, who comes in with a sudden onset of bilateral leg swelling that started 3 weeks ago. He woke up one day in the morning and noticed his legs were swollen. He went to his PCP Dr. Dr. Toney and was prescribed Lasix 20mg, which did not help. Patient denies headache, fever, chills, nausea, vomit, chest pain, shortness of breath, cough, lightheadedness, abdominal pain, diarrhea, constipation, dark/bloody stool, dysuria, hematuria, loss of strength, or loss of sensation. He was admitted for bilateral lower extremity cellulitis and treated with IV antibiotics.     He denies prior history of lower extremity cellulitis, diabetes, hematology disorders, cancers.   Review of lab studies:  On admit, WBC- 17.14, diff- 82%N, 6%L, 8%mono, 1%eos; Hgb- 9.5, MCV- 76.7; Platelets- 385K. Prior CBC on 5/3/23- WBC- 6.05, normal differential; Hgb- 10.9, Hct- 34.5; MCV- 76.5.   (11/24/24)- ESR- 29, CRP- 47. Blood cultures negative on 11/24, 11/26.         CT Abd/pelvis without contrast on 11/28/24:  BOWEL: No bowel obstruction. Appendix is normal. Wall thickening of the   rectosigmoid and descending colon with adjacent fat stranding, compatible   with colitis. Spleen enlarged at 17 cm. No lymphadenopathy      He has slight reddish discoloration in both legs at current time      Review of systems:  No fever, chills, night sweats  Romelia: no mouth sores  Pulm: no cough, shortness of breath  CV- no chest pain  Abd- no nausea, vomiting. Mostly constipated, no abd pain.   Ext: no edema in legs today  Lymphatics: no swollen lymph nodes  Neuro: no paresthesia  Pain: 0 69 year old male with PMHx of L knee replacement, OCD, Panic disorder, Anxiety, Opioid dependence on suboxone (from oxycodone use from back surgery, expected to finish long suboxone taper in 1 year), HTN, who comes in with a sudden onset of bilateral leg swelling that started 3 weeks ago. He woke up one day in the morning and noticed his legs were swollen. He went to his PCP Dr. Dr. Toney and was prescribed Lasix 20mg, which did not help. Patient denies headache, fever, chills, nausea, vomit, chest pain, shortness of breath, cough, lightheadedness, abdominal pain, diarrhea, constipation, dark/bloody stool, dysuria, hematuria, loss of strength, or loss of sensation. He was admitted for bilateral lower extremity cellulitis and treated with IV antibiotics. He has Not received steroids during this admission     He denies prior history of lower extremity cellulitis, diabetes, gastrointestinal disorders(except constipation), hematology disorders, cancers.     Review of lab studies:  On admit, WBC- 17.14, diff- 82%N, 6%L, 8%mono, 1%eos; Hgb- 9.5, MCV- 76.7; Platelets- 385K. Prior CBC on 5/3/23- WBC- 6.05, normal differential; Hgb- 10.9, Hct- 34.5; MCV- 76.5.   (11/24/24)- ESR- 29, CRP- 47. Blood cultures negative on 11/24, 11/26.   WBC has ranged from 15K- 22K, predominantly neutrophils, recently with left shift. Review of PBS with myelocytes noted. Immature granulocytes documented on differential. Creatinine in normal range now, (12/2/24)- TP- 8.1, albumin- 3.3.     CT Abd/pelvis without contrast on 11/28/24:  BOWEL: No bowel obstruction. Appendix is normal. Wall thickening of the   rectosigmoid and descending colon with adjacent fat stranding, compatible   with colitis. Spleen enlarged at 17 cm. No lymphadenopathy    He has never had colonoscopy done.     He has slight reddish discoloration in both legs at current time(described by patient today)      Review of systems:  No fever, chills, night sweats  Romelia: no mouth sores  Pulm: no cough, shortness of breath  CV- no chest pain  Abd- no nausea, vomiting. Mostly constipated, no abd pain.   Ext: no edema in legs today  Lymphatics: no swollen lymph nodes  Neuro: no paresthesia  Pain: 0    Social history; ; Tobacco use- former smoker, Quit 6 years ago.     Family Medical History- Father- h/o prostate cancer; 1st cousin male- h/o AML    Physical Exam- refer to Hospitalist exam as this is Telehealth visit    Allergies- No Known Allergies    MEDICATIONS  (STANDING):  ALPRAZolam 2 milliGRAM(s) Oral <User Schedule>  buprenorphine 8 mG/naloxone 2 mG SL Film 1 Film(s) SubLingual two times a day  furosemide    Tablet 20 milliGRAM(s) Oral daily  heparin   Injectable 5000 Unit(s) SubCutaneous every 8 hours  influenza  Vaccine (HIGH DOSE) 0.5 milliLiter(s) IntraMuscular once  melatonin 3 milliGRAM(s) Oral at bedtime       69 year old male with PMHx of L knee replacement, OCD, Panic disorder, Anxiety, Opioid dependence on suboxone (from oxycodone use from back surgery, expected to finish long suboxone taper in 1 year), HTN, who comes in with a sudden onset of bilateral leg swelling that started 3 weeks ago. He woke up one day in the morning and noticed his legs were swollen. He went to his PCP Dr. Dr. Toney and was prescribed Lasix 20mg, which did not help. Patient denies headache, fever, chills, nausea, vomit, chest pain, shortness of breath, cough, lightheadedness, abdominal pain, diarrhea, constipation, dark/bloody stool, dysuria, hematuria, loss of strength, or loss of sensation. He was admitted for bilateral lower extremity cellulitis and treated with IV antibiotics. He has Not received steroids during this admission     He denies prior history of lower extremity cellulitis, diabetes, gastrointestinal disorders(except constipation), hematology disorders, cancers.     Review of lab studies:  On admit, WBC- 17.14, diff- 82%N, 6%L, 8%mono, 1%eos; Hgb- 9.5, MCV- 76.7; Platelets- 385K. Prior CBC on 5/3/23- WBC- 6.05, normal differential; Hgb- 10.9, Hct- 34.5; MCV- 76.5.   (11/24/24)- ESR- 29, CRP- 47. Blood cultures negative on 11/24, 11/26.   WBC has ranged from 15K- 22K, predominantly neutrophils, recently with left shift. Review of PBS with myelocytes noted. Immature granulocytes documented on differential. Creatinine in normal range since 11/27; (12/2/24)- TP- 8.1, albumin- 3.3.     CT Abd/pelvis without contrast on 11/28/24:  BOWEL: No bowel obstruction. Appendix is normal. Wall thickening of the   rectosigmoid and descending colon with adjacent fat stranding, compatible   with colitis. Spleen enlarged at 17 cm. No lymphadenopathy    He has never had colonoscopy done.     He has slight reddish discoloration in both legs at current time(described by patient today)      Review of systems:  No fever, chills, night sweats  Romelia: no mouth sores  Pulm: no cough, shortness of breath  CV- no chest pain  Abd- no nausea, vomiting. Mostly constipated, no abd pain.   Ext: no edema in legs today  Lymphatics: no swollen lymph nodes  Neuro: no paresthesia  Pain: 0    Social history; ; Tobacco use- former smoker, Quit 6 years ago.     Family Medical History- Father- h/o prostate cancer; 1st cousin male- h/o AML    Physical Exam- refer to Hospitalist exam as this is Telehealth visit    Allergies- No Known Allergies    MEDICATIONS  (STANDING):  ALPRAZolam 2 milliGRAM(s) Oral <User Schedule>  buprenorphine 8 mG/naloxone 2 mG SL Film 1 Film(s) SubLingual two times a day  furosemide    Tablet 20 milliGRAM(s) Oral daily  heparin   Injectable 5000 Unit(s) SubCutaneous every 8 hours  influenza  Vaccine (HIGH DOSE) 0.5 milliLiter(s) IntraMuscular once  melatonin 3 milliGRAM(s) Oral at bedtime

## 2024-12-03 NOTE — PROGRESS NOTE ADULT - ASSESSMENT
Patient is a 68 y/o man, with PMHx of L knee replacement, OCD, panic disorder, anxiety, opioid dependence-on Suboxone, and HTN, who comes in with a sudden onset of bilateral leg swelling that started 3 weeks ago. He woke up one day in the morning and noticed his legs were swollen. He went to his PCP Dr. Dr. Toney and was prescribed Lasix 20mg, which did not help. Patient denies headache, fever, chills, nausea, vomit, chest pain, shortness of breath, cough, lightheadedness, abdominal pain, diarrhea, constipation, dark/bloody stool, dysuria, hematuria, loss of strength, or loss of sensation. (24 Nov 2024 20:25)  ID consulted for workup and antibiotic management   Seen and examined at bedside. He is afebrile. Says no fever at home. Does not recall any triggering event, fall or trauma. Has pet cat that scratched his leg recently without any deep puncture wound. No recent antibiotics use. He has not had skin infections before. 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 He is not diabetic. He has no known allergies. Work up notable for leukocytosis, ESR: 29, CRP:47. US doppler without DVT. CT leg with soft tissue swelling and no fluid collection.    11/26: continues having fevers 102.3 today, RA, WBC increased 22.16, Cr elevated 1.66, BCs NGTD, MRSA PCR negative. Abx were changed by medicine to Clindamycin.  11/27: spiked a fever yesterday 102.3, no fevers today, RA, WBC with mild improvement 20.53, Cr normalized, BCs NGTD, two more sets were collected due to yesterday's fever, Clindamycin IV continued for now.    11/29: Afebrile today. T-max 100.6 last night. WBC trended down to 15k. CT A/P with splenomegaly and colitis of rectosigmoid and descending colon. Would stop clinda and start meropenem today.  12/2: Afebrile. No new fevers. Cellulitis and colitis seem to have improved. His WBC counts remain elevated. He has huge spleen on his CT on my review. Would have hem/onc eval. Will likely stop antibiotics soon.  12/3: no fever, RA, WBC still high but better 19.32, Cr ok, BCs NGTD x 4, MRSA PCR negative, Meropenem IV - day #5, will stop after today's doses.     Impression:  1. Right LE cellulitis  2. Bilateral LE edema  3. Leukocytosis and fever spikes  4. Colitis  5. Splenomegaly  6. Hx of L knee replacement    Recommendations:  --On meropenem 1 g IV q8h Day # 5, will stop after today's doses  --Hem/onc evaluation is appreciated   --Leg elevation and warm compresses  --Trend temp curve and monitor white counts  --Stool count if having diarrhea   --Probiotics    Discussed with Dr. Cota

## 2024-12-03 NOTE — CONSULT NOTE ADULT - ASSESSMENT
69 year old male with PMHx of L knee replacement, OCD, Panic disorder, Anxiety, Opioid dependence on suboxone (from oxycodone use from back surgery, expected to finish long suboxone taper in 1 year), HTN, who comes in with a sudden onset of bilateral leg swelling that started 3 weeks ago. He woke up one day in the morning and noticed his legs were swollen. He went to his PCP Dr. Dr. Toney and was prescribed Lasix 20mg, which did not help. Patient denies headache, fever, chills, nausea, vomit, chest pain, shortness of breath, cough, lightheadedness, abdominal pain, diarrhea, constipation, dark/bloody stool, dysuria, hematuria, loss of strength, or loss of sensation. He was admitted to [facility] on 11/24/24 for bilateral lower extremity cellulitis and treated with IV antibiotics. He has Not received steroids during this admission     He denies prior history of lower extremity cellulitis, diabetes, gastrointestinal disorders(except constipation), hematology disorders, cancers.     CT Abd/pelvis without contrast on 11/28/24:  Wall thickening of the rectosigmoid and descending colon with adjacent fat stranding, compatible with colitis. Spleen enlarged at 17 cm. No lymphadenopathy    He has never had colonoscopy done.        69 year old male with PMHx of L knee replacement, OCD, Panic disorder, Anxiety, Opioid dependence on suboxone (from oxycodone use from back surgery, expected to finish long suboxone taper in 1 year), HTN, who comes in with a sudden onset of bilateral leg swelling that started 3 weeks ago. He woke up one day in the morning and noticed his legs were swollen. He went to his PCP Dr. Dr. Toney and was prescribed Lasix 20mg, which did not help. Patient denies headache, fever, chills, nausea, vomit, chest pain, shortness of breath, cough, lightheadedness, abdominal pain, diarrhea, constipation, dark/bloody stool, dysuria, hematuria, loss of strength, or loss of sensation. He was admitted to [facility] on 11/24/24 for bilateral lower extremity cellulitis and treated with IV antibiotics. He has Not received steroids during this admission     He denies prior history of lower extremity cellulitis, diabetes, gastrointestinal disorders(except constipation), hematology disorders, cancers.     CT Abd/pelvis without contrast on 11/28/24:  Wall thickening of the rectosigmoid and descending colon with adjacent fat stranding, compatible with colitis. Spleen enlarged at 17 cm. No lymphadenopathy    He has never had colonoscopy done.     Assessment/Plan:  1) Leukocytosis with left shift- review of labs from 5/3/23 documents normal WBC with differential. Patient admitted with lower extremity cellulitis with elevated ESR, CRP, treated with IV antibiotics now with resolution of lower extremity edema and residual slight reddish discoloration in both legs at current time. He has persistent leukocytosis with left shift. He also had colitis involving rectosigmoid and descending colon with adjacent fat stranding, and splenomegaly; no lymphadenopathy on CT Abd/pelvis without contrast on 11/28/24.  Plan-  Repeat CT abd/pelvis with Contrast IV(ask radiology if oral contrast recommended to evaluate bowel wall) to reassess colitis s/p antibiotics and better evaluate for lymphadenopathy. Would also do CT chest with IV contrast to evaluate for LAD.   Recommend GI consult in setting of colitis- patient has never had colonoscopy.   Repeat ESR, CRP levels. In setting of splenomegaly, check LDH, serum immunoelectrophoresis.     2) Anemia- microcytic; noted on CBC from 5/3/23. RBC indices suggest iron deficiency state.   Plan-  Check serum iron, TIBC, Ferritin(will likely be elevated as acute phase reactant)  Check Vitamin B-12, Folate levels.   Recommend GI evaluation.     Today on 12/3/24, I reviewed admission H&P, hospitalist notes, lab studies, radiology studies prior to hematology consult. I performed history in discussion with patient and his wife(present by cell phone) and PA present in hospital room. I discussed differential diagnosis and plan of care with patient and his wife as well as PA with recommendations for lab studies and additional radiology studies, GI evaluation. Total time spent for Hematology consult visit is 60 minutes.

## 2024-12-03 NOTE — PROGRESS NOTE ADULT - SUBJECTIVE AND OBJECTIVE BOX
JUAQUIN JOHNSON  MRN-[id number]  69y (1955)    Follow Up:  Leukocytosis, colitis, s/p cellulitis     Interval History: The pt was seen and examined earlier, not in acute distress, no new complaints, awake and alert, appropriate. Pt is afebrile, RA, WBC still high but better 19.32.     PAST MEDICAL & SURGICAL HISTORY:  HTN (hypertension)      Narcotic dependence      Anxiety      Panic disorder      OCD (obsessive compulsive disorder)      S/P total knee arthroplasty      Previous back surgery      S/P epidural steroid injection          ROS:    [ ] Unobtainable because:  [ x] All other systems negative    Constitutional: no fever, no chills  Head: no trauma  Eyes: no vision changes, no eye pain  ENT:  no sore throat, no rhinorrhea  Cardiovascular:  no chest pain, no palpitation  Respiratory:  no SOB, no cough  GI:  no abd pain, no vomiting, no diarrhea  urinary: no dysuria, no hematuria, no flank pain  musculoskeletal:  no joint pain, no joint swelling  skin:  no rash  neurology:  no headache, no seizure, no change in mental status  psych: no anxiety, no depression         Allergies  No Known Allergies        ANTIMICROBIALS:  meropenem  IVPB 1000 every 8 hours      OTHER MEDS:  acetaminophen     Tablet .. 650 milliGRAM(s) Oral every 6 hours PRN  ALPRAZolam 2 milliGRAM(s) Oral <User Schedule>  buprenorphine 8 mG/naloxone 2 mG SL Film 1 Film(s) SubLingual two times a day  furosemide    Tablet 20 milliGRAM(s) Oral daily  heparin   Injectable 5000 Unit(s) SubCutaneous every 8 hours  influenza  Vaccine (HIGH DOSE) 0.5 milliLiter(s) IntraMuscular once  iohexol 300 mG (iodine)/mL Oral Solution 30 milliLiter(s) Oral once  melatonin 3 milliGRAM(s) Oral at bedtime      Vital Signs Last 24 Hrs  T(C): 36.7 (03 Dec 2024 12:11), Max: 36.7 (03 Dec 2024 12:11)  T(F): 98 (03 Dec 2024 12:11), Max: 98 (03 Dec 2024 12:11)  HR: 74 (03 Dec 2024 12:11) (68 - 74)  BP: 154/91 (03 Dec 2024 12:11) (130/75 - 154/91)  BP(mean): --  RR: 17 (03 Dec 2024 12:11) (16 - 18)  SpO2: 98% (03 Dec 2024 12:11) (95% - 98%)    Parameters below as of 03 Dec 2024 12:11  Patient On (Oxygen Delivery Method): room air        Physical Exam:  Constitutional: Pleasant man, non-toxic, no distress  HEAD/EYES: anicteric, no conjunctival injection  ENT:  supple, no thrush  Cardiovascular:   normal S1, S2, no murmur   Respiratory:  clear BS bilaterally, no wheezes, no rales  GI:  soft, mild left sided tenderness, normal bowel sounds, not distended   :  no corley, no CVA tenderness  Musculoskeletal:  Bilateral LE erythema and swelling significantly improved   Neurologic: awake and alert, normal strength, no focal findings  Skin:  Bilateral onychomycosis  Heme/Onc: no lymphadenopathy   Psychiatric:  awake, alert, appropriate mood    WBC Count: 19.37 K/uL (12-03 @ 07:08)  WBC Count: 21.05 K/uL (12-02 @ 06:45)  WBC Count: 17.02 K/uL (12-01 @ 06:17)  WBC Count: 14.87 K/uL (11-30 @ 05:25)  WBC Count: 15.07 K/uL (11-29 @ 06:59)  WBC Count: 19.17 K/uL (11-28 @ 06:15)  WBC Count: 20.53 K/uL (11-27 @ 06:20)                            9.2    19.37 )-----------( 408      ( 03 Dec 2024 07:08 )             31.1       12-03    141  |  105  |  12  ----------------------------<  95  3.8   |  30  |  0.66    Ca    9.2      03 Dec 2024 07:08  Phos  3.2     12-02  Mg     1.7     12-02    TPro  8.1  /  Alb  3.3  /  TBili  0.8  /  DBili  x   /  AST  42[H]  /  ALT  22  /  AlkPhos  120  12-02      Urinalysis Basic - ( 03 Dec 2024 07:08 )    Color: x / Appearance: x / SG: x / pH: x  Gluc: 95 mg/dL / Ketone: x  / Bili: x / Urobili: x   Blood: x / Protein: x / Nitrite: x   Leuk Esterase: x / RBC: x / WBC x   Sq Epi: x / Non Sq Epi: x / Bacteria: x        Creatinine Trend: 0.66<--, 0.87<--, 0.77<--, 0.75<--, 0.82<--, 1.05<--      MICROBIOLOGY:  v  .Blood BLOOD  11-26-24   No growth at 5 days  --  --      .Blood BLOOD  11-26-24   No growth at 5 days  --  --      .Blood BLOOD  11-24-24   No growth at 5 days  --  --      .Blood BLOOD  11-24-24   No growth at 5 days  --  --      C-Reactive Protein: 47 (11-24)      RADIOLOGY:     JUAQUIN JOHNSON  MRN-[id number]  69y (1955)    Follow Up:  Leukocytosis, colitis, s/p cellulitis     Interval History: The pt was seen and examined earlier, not in acute distress, no new complaints, awake and alert, appropriate. Pt is afebrile, RA, WBC still high but better 19.32.     ROS:    [ ] Unobtainable because:  [ x] All other systems negative    Constitutional: no fever, no chills  Head: no trauma  Eyes: no vision changes, no eye pain  ENT:  no sore throat, no rhinorrhea  Cardiovascular:  no chest pain, no palpitation  Respiratory:  no SOB, no cough  GI:  no abd pain, no vomiting, no diarrhea  urinary: no dysuria, no hematuria, no flank pain  musculoskeletal:  no joint pain, no joint swelling  skin:  no rash  neurology:  no headache, no seizure, no change in mental status  psych: no anxiety, no depression       Allergies  No Known Allergies      ANTIMICROBIALS:  meropenem  IVPB 1000 every 8 hours    OTHER MEDS:  acetaminophen     Tablet .. 650 milliGRAM(s) Oral every 6 hours PRN  ALPRAZolam 2 milliGRAM(s) Oral <User Schedule>  buprenorphine 8 mG/naloxone 2 mG SL Film 1 Film(s) SubLingual two times a day  furosemide    Tablet 20 milliGRAM(s) Oral daily  heparin   Injectable 5000 Unit(s) SubCutaneous every 8 hours  influenza  Vaccine (HIGH DOSE) 0.5 milliLiter(s) IntraMuscular once  iohexol 300 mG (iodine)/mL Oral Solution 30 milliLiter(s) Oral once  melatonin 3 milliGRAM(s) Oral at bedtime      Vital Signs Last 24 Hrs  T(C): 36.7 (03 Dec 2024 12:11), Max: 36.7 (03 Dec 2024 12:11)  T(F): 98 (03 Dec 2024 12:11), Max: 98 (03 Dec 2024 12:11)  HR: 74 (03 Dec 2024 12:11) (68 - 74)  BP: 154/91 (03 Dec 2024 12:11) (130/75 - 154/91)  BP(mean): --  RR: 17 (03 Dec 2024 12:11) (16 - 18)  SpO2: 98% (03 Dec 2024 12:11) (95% - 98%)    Parameters below as of 03 Dec 2024 12:11  Patient On (Oxygen Delivery Method): room air      Physical Exam:  Constitutional: Pleasant man, non-toxic, no distress  HEAD/EYES: anicteric, no conjunctival injection  ENT:  supple, no thrush  Cardiovascular:   normal S1, S2, no murmur   Respiratory:  clear BS bilaterally, no wheezes, no rales  GI:  soft, mild left sided tenderness, normal bowel sounds, not distended   :  no corley, no CVA tenderness  Musculoskeletal:  Bilateral LE erythema and swelling significantly improved   Neurologic: awake and alert, normal strength, no focal findings  Skin:  Bilateral onychomycosis  Heme/Onc: no lymphadenopathy   Psychiatric:  awake, alert, appropriate mood    WBC Count: 19.37 K/uL (12-03 @ 07:08)  WBC Count: 21.05 K/uL (12-02 @ 06:45)  WBC Count: 17.02 K/uL (12-01 @ 06:17)  WBC Count: 14.87 K/uL (11-30 @ 05:25)  WBC Count: 15.07 K/uL (11-29 @ 06:59)  WBC Count: 19.17 K/uL (11-28 @ 06:15)  WBC Count: 20.53 K/uL (11-27 @ 06:20)                            9.2    19.37 )-----------( 408      ( 03 Dec 2024 07:08 )             31.1       12-03    141  |  105  |  12  ----------------------------<  95  3.8   |  30  |  0.66    Ca    9.2      03 Dec 2024 07:08  Phos  3.2     12-02  Mg     1.7     12-02    TPro  8.1  /  Alb  3.3  /  TBili  0.8  /  DBili  x   /  AST  42[H]  /  ALT  22  /  AlkPhos  120  12-02      Urinalysis Basic - ( 03 Dec 2024 07:08 )    Color: x / Appearance: x / SG: x / pH: x  Gluc: 95 mg/dL / Ketone: x  / Bili: x / Urobili: x   Blood: x / Protein: x / Nitrite: x   Leuk Esterase: x / RBC: x / WBC x   Sq Epi: x / Non Sq Epi: x / Bacteria: x        Creatinine Trend: 0.66<--, 0.87<--, 0.77<--, 0.75<--, 0.82<--, 1.05<--      MICROBIOLOGY:  .Blood BLOOD  11-26-24   No growth at 5 days  --  --      .Blood BLOOD  11-26-24   No growth at 5 days  --  --      .Blood BLOOD  11-24-24   No growth at 5 days  --  --      .Blood BLOOD  11-24-24   No growth at 5 days  --  --      C-Reactive Protein: 47 (11-24)      RADIOLOGY:

## 2024-12-03 NOTE — PROGRESS NOTE ADULT - NS ATTEND AMEND GEN_ALL_CORE FT
Agree with the above assessment and plan.  Treated adequately for cellulitis and colitis  Abdominal pain, diarrhea and fevers resolved  Persistent leukocytosis, +splenomegaly  Follow hem/onc evaluation  Monitor off of antibiotics after today's dose of merrem    Discussed with Dr. Kareem Cota MD  Attending Physician  Division of Infectious Diseases   Available via Microsoft Teams
Discussed with Trudi Harrison NP. I have personally seen, examined and participated in the care of this patient. I have reviewed all pertinent clinical information, including history, physical exam, plan and the NP's note and agree. Note has been reviewed and made any necessary modifications.    The leg swelling and erythema significantly improved. He walked with PT. Feels better.   Leukocytosis has not improved. Would continue IV for another 24-48 hrs. Monitor white count.  Would switch to PO cephalexin to complete a short course when ready for discharge.    Discussed with medical PA    Lou Cota MD  Infectious Disease Attending  Available on Microsoft Teams
I have reviewed all pertinent clinical information and agree with the NP's note.  Any new labs, recent cultures, new imaging (if applicable) and vitals have been reviewed today.  All necessary adjustments to management have been made.  Agree with the above assessment and plan.    Lou Cota MD  Attending Physician  Division of Infectious Diseases   Available via Microsoft Teams

## 2024-12-04 LAB
BASOPHILS # BLD AUTO: 0.35 K/UL — HIGH (ref 0–0.2)
BASOPHILS NFR BLD AUTO: 1.9 % — SIGNIFICANT CHANGE UP (ref 0–2)
CRP SERPL-MCNC: 19 MG/L — HIGH
EOSINOPHIL # BLD AUTO: 0.42 K/UL — SIGNIFICANT CHANGE UP (ref 0–0.5)
EOSINOPHIL NFR BLD AUTO: 2.3 % — SIGNIFICANT CHANGE UP (ref 0–6)
ERYTHROCYTE [SEDIMENTATION RATE] IN BLOOD: 23 MM/HR — HIGH (ref 0–20)
FOLATE SERPL-MCNC: 17.3 NG/ML — SIGNIFICANT CHANGE UP
HCT VFR BLD CALC: 31 % — LOW (ref 39–50)
HGB BLD-MCNC: 9.1 G/DL — LOW (ref 13–17)
IMM GRANULOCYTES NFR BLD AUTO: 5.4 % — HIGH (ref 0–0.9)
IRON SATN MFR SERPL: 31 % — SIGNIFICANT CHANGE UP (ref 16–55)
IRON SATN MFR SERPL: 74 UG/DL — SIGNIFICANT CHANGE UP (ref 45–165)
LDH SERPL L TO P-CCNC: 846 U/L — HIGH (ref 50–242)
LYMPHOCYTES # BLD AUTO: 12.6 % — LOW (ref 13–44)
LYMPHOCYTES # BLD AUTO: 2.35 K/UL — SIGNIFICANT CHANGE UP (ref 1–3.3)
MCHC RBC-ENTMCNC: 22.9 PG — LOW (ref 27–34)
MCHC RBC-ENTMCNC: 29.4 G/DL — LOW (ref 32–36)
MCV RBC AUTO: 78.1 FL — LOW (ref 80–100)
MONOCYTES # BLD AUTO: 1.02 K/UL — HIGH (ref 0–0.9)
MONOCYTES NFR BLD AUTO: 5.5 % — SIGNIFICANT CHANGE UP (ref 2–14)
NEUTROPHILS # BLD AUTO: 13.43 K/UL — HIGH (ref 1.8–7.4)
NEUTROPHILS NFR BLD AUTO: 72.3 % — SIGNIFICANT CHANGE UP (ref 43–77)
NRBC # BLD: 0 /100 WBCS — SIGNIFICANT CHANGE UP (ref 0–0)
PLATELET # BLD AUTO: 393 K/UL — SIGNIFICANT CHANGE UP (ref 150–400)
PROT SERPL-MCNC: 6.4 G/DL — SIGNIFICANT CHANGE UP (ref 6–8.3)
RBC # BLD: 3.97 M/UL — LOW (ref 4.2–5.8)
RBC # FLD: 22.4 % — HIGH (ref 10.3–14.5)
TIBC SERPL-MCNC: 234 UG/DL — SIGNIFICANT CHANGE UP (ref 220–430)
UIBC SERPL-MCNC: 161 UG/DL — SIGNIFICANT CHANGE UP (ref 110–370)
VIT B12 SERPL-MCNC: >2000 PG/ML — HIGH (ref 232–1245)
WBC # BLD: 18.58 K/UL — HIGH (ref 3.8–10.5)
WBC # FLD AUTO: 18.58 K/UL — HIGH (ref 3.8–10.5)

## 2024-12-04 PROCEDURE — 99232 SBSQ HOSP IP/OBS MODERATE 35: CPT

## 2024-12-04 RX ADMIN — ACETAMINOPHEN, DIPHENHYDRAMINE HCL, PHENYLEPHRINE HCL 3 MILLIGRAM(S): 325; 25; 5 TABLET ORAL at 22:31

## 2024-12-04 RX ADMIN — FUROSEMIDE 20 MILLIGRAM(S): 40 TABLET ORAL at 05:39

## 2024-12-04 RX ADMIN — Medication 2 MILLIGRAM(S): at 05:39

## 2024-12-04 RX ADMIN — Medication 2 MILLIGRAM(S): at 20:38

## 2024-12-04 RX ADMIN — BUPRENORPHINE AND NALOXONE 1 FILM(S): 8; 2 TABLET SUBLINGUAL at 05:38

## 2024-12-04 RX ADMIN — BUPRENORPHINE AND NALOXONE 1 FILM(S): 8; 2 TABLET SUBLINGUAL at 18:09

## 2024-12-04 RX ADMIN — Medication 5000 UNIT(S): at 15:56

## 2024-12-04 RX ADMIN — Medication 5000 UNIT(S): at 22:31

## 2024-12-04 RX ADMIN — Medication 2 MILLIGRAM(S): at 12:29

## 2024-12-04 RX ADMIN — Medication 5000 UNIT(S): at 05:39

## 2024-12-04 NOTE — PROGRESS NOTE ADULT - ASSESSMENT
69M, with MHx of L knee replacement, OCD, Panic disorder, Anxiety, Opioid dependence on suboxone (from oxycodone use from back surgery, expected to finish long suboxone taper in 1 year), HTN, who comes in with a sudden onset of bilateral leg swelling that started 3 weeks ago. Admitted for cellulitis +/- colitis    ##Leg swelling   ##Colitis   P/w 1 month of BL leg swelling. LLE swelling is improved. RLE still swollen.   TTE reviewed EF 69%; H2FPEF 49%. BNP 2423. CTAP with colitis   switch to PO lasix  Abx changed to IV ewa  BCx NGTD  c/w ID, plan for 5 days total of ewa     #Splenomegaly  Given splenomegaly and leukocytosis, asked PA to consult heme/onc, pending recs    #Narcotic dependence   Home med Suboxone (due to oxycodone use from back surgery, expected to finish long suboxone taper in 1 year)  C/w Suboxone.    #Panic disorder  #OCD   #anxiety   Home med Xanax 2mg TID. Patient says he gets very bad panic attacks so he will not taper off of Xanax  - C/w home med.    #Anemia.   Microcytic anemia  P/w Hgb 9.5 (it was 10.9 in May 2023)  Possibly iron deficiency anemia  No sign of bleeding  Outpt follow up.    #HTN (hypertension).   hold for now lisinopril 5mg.    Diet: DASH  DVT prophylaxis: SQH  Dispo: pending heme/onc recs and completion of Ewa (last dose 12/3 evening)  Code Status:     69M, with MHx of L knee replacement, OCD, Panic disorder, Anxiety, Opioid dependence on suboxone (from oxycodone use from back surgery, expected to finish long suboxone taper in 1 year), HTN, who comes in with a sudden onset of bilateral leg swelling that started 3 weeks ago. Admitted for cellulitis +/- colitis    ##Leg swelling   ##Colitis   P/w 1 month of BL leg swelling. LLE swelling is improved. RLE still swollen.   TTE reviewed EF 69%; H2FPEF 49%. BNP 2423. CTAP with colitis   switch to PO lasix  Abx changed to IV ewa  BCx NGTD  c/w ID, plan for 5 days total of ewa completed      #Splenomegaly  Given splenomegaly and leukocytosis, heme onc consult appreciated  wants to follow up with GI as outpatient ha never had a colonoscopy     #Narcotic dependence   Home med Suboxone (due to oxycodone use from back surgery, expected to finish long suboxone taper in 1 year)  C/w Suboxone.    #Panic disorder  #OCD   #anxiety   Home med Xanax 2mg TID. Patient says he gets very bad panic attacks so he will not taper off of Xanax  - C/w home med.    #Anemia.   Microcytic anemia  P/w Hgb 9.5 (it was 10.9 in May 2023)  Possibly iron deficiency anemia  No sign of bleeding  Outpt follow up.    #HTN (hypertension).   hold for now lisinopril 5mg.    Diet: DASH  DVT prophylaxis: SQH  Dispo: pending heme/onc recs and completion of Ewa (last dose 12/3 evening)  Code Status:

## 2024-12-04 NOTE — PROGRESS NOTE ADULT - SUBJECTIVE AND OBJECTIVE BOX
HPI:  Patient is a 69M, with PMHx of L knee replacement, OCD, Panic disorder, Anxiety, Opioid dependence on suboxone (from oxycodone use from back surgery, expected to finish long suboxone taper in 1 year), HTN, who comes in with a sudden onset of bilateral leg swelling that started 3 weeks ago. He woke up one day in the morning and noticed his legs were swollen. He went to his PCP Dr. Dr. Toney and was prescribed Lasix 20mg, which did not help. Patient denies headache, fever, chills, nausea, vomit, chest pain, shortness of breath, cough, lightheadedness, abdominal pain, diarrhea, constipation, dark/bloody stool, dysuria, hematuria, loss of strength, or loss of sensation. (24 Nov 2024 20:25)  Patient is a 69y old  Male who presents with a chief complaint of Bilateral leg swelling (03 Dec 2024 16:25)      INTERVAL HPI/OVERNIGHT EVENTS:    MEDICATIONS  (STANDING):  ALPRAZolam 2 milliGRAM(s) Oral <User Schedule>  buprenorphine 8 mG/naloxone 2 mG SL Film 1 Film(s) SubLingual two times a day  furosemide    Tablet 20 milliGRAM(s) Oral daily  heparin   Injectable 5000 Unit(s) SubCutaneous every 8 hours  influenza  Vaccine (HIGH DOSE) 0.5 milliLiter(s) IntraMuscular once  melatonin 3 milliGRAM(s) Oral at bedtime    MEDICATIONS  (PRN):  acetaminophen     Tablet .. 650 milliGRAM(s) Oral every 6 hours PRN Temp greater or equal to 38C (100.4F), Mild Pain (1 - 3)      Allergies    No Known Allergies    Intolerances        REVIEW OF SYSTEMS:  CONSTITUTIONAL: No fever, weight loss, or fatigue  EYES: No eye pain, visual disturbances, or discharge  ENMT:  No difficulty hearing, tinnitus, vertigo; No sinus or throat pain  NECK: No pain or stiffness  BREASTS: No pain, masses, or nipple discharge  RESPIRATORY: No cough, wheezing, chills or hemoptysis; No shortness of breath  CARDIOVASCULAR: No chest pain, palpitations, dizziness, or leg swelling  GASTROINTESTINAL: No abdominal or epigastric pain. No nausea, vomiting, or hematemesis; No diarrhea or constipation. No melena or hematochezia.  GENITOURINARY: No dysuria, frequency, hematuria, or incontinence  NEUROLOGICAL: No headaches, memory loss, loss of strength, numbness, or tremors  SKIN: No itching, burning, rashes, or lesions   LYMPH NODES: No enlarged glands  ENDOCRINE: No heat or cold intolerance; No hair loss  MUSCULOSKELETAL: No joint pain or swelling; No muscle, back, or extremity pain  PSYCHIATRIC: No depression, anxiety, mood swings, or difficulty sleeping  HEME/LYMPH: No easy bruising, or bleeding gums  ALLERGY AND IMMUNOLOGIC: No hives or eczema    Vital Signs Last 24 Hrs  T(C): 36.6 (04 Dec 2024 11:49), Max: 36.8 (03 Dec 2024 17:39)  T(F): 97.9 (04 Dec 2024 11:49), Max: 98.2 (03 Dec 2024 17:39)  HR: 71 (04 Dec 2024 11:49) (71 - 85)  BP: 124/70 (04 Dec 2024 11:49) (124/70 - 160/78)  BP(mean): --  RR: 16 (04 Dec 2024 11:49) (14 - 17)  SpO2: 98% (04 Dec 2024 11:49) (93% - 98%)    Parameters below as of 04 Dec 2024 05:28  Patient On (Oxygen Delivery Method): room air        PHYSICAL EXAM:  GENERAL: NAD, well-groomed, well-developed  HEAD:  Atraumatic, Normocephalic  EYES: EOMI, PERRLA, conjunctiva and sclera clear  ENMT: No tonsillar erythema, exudates, or enlargement; Moist mucous membranes, Good dentition, No lesions  NECK: Supple, No JVD, Normal thyroid  NERVOUS SYSTEM:  Alert & Oriented X3, Good concentration; Motor Strength 5/5 B/L upper and lower extremities; DTRs 2+ intact and symmetric  CHEST/LUNG: Clear to ascultation  bilaterally; No rales, rhonchi, wheezing, or rubs  HEART: Regular rate and rhythm; No murmurs, rubs, or gallops  ABDOMEN: Soft, Nontender, Nondistended; Bowel sounds present  EXTREMITIES:  2+ Peripheral Pulses, No clubbing, cyanosis, or edema  LYMPH: No lymphadenopathy noted  SKIN: No rashes or lesions    LABS:                        9.1    18.58 )-----------( 393      ( 04 Dec 2024 05:42 )             31.0     12-03    141  |  105  |  12  ----------------------------<  95  3.8   |  30  |  0.66    Ca    9.2      03 Dec 2024 07:08    TPro  6.4  /  Alb  x   /  TBili  x   /  DBili  x   /  AST  x   /  ALT  x   /  AlkPhos  x   12-04      Urinalysis Basic - ( 03 Dec 2024 07:08 )    Color: x / Appearance: x / SG: x / pH: x  Gluc: 95 mg/dL / Ketone: x  / Bili: x / Urobili: x   Blood: x / Protein: x / Nitrite: x   Leuk Esterase: x / RBC: x / WBC x   Sq Epi: x / Non Sq Epi: x / Bacteria: x      CAPILLARY BLOOD GLUCOSE          RADIOLOGY & ADDITIONAL TESTS:    Imaging Personally Reviewed:  [ ] YES  [ ] NO    Consultant(s) Notes Reviewed:  [ ] YES  [ ] NO    Care Discussed with Consultants/Other Providers [ ] YES  [ ] NO HPI:  Patient is a 69M, with PMHx of L knee replacement, OCD, Panic disorder, Anxiety, Opioid dependence on suboxone (from oxycodone use from back surgery, expected to finish long suboxone taper in 1 year), HTN, who comes in with a sudden onset of bilateral leg swelling that started 3 weeks ago. He woke up one day in the morning and noticed his legs were swollen. He went to his PCP Dr. Dr. Toney and was prescribed Lasix 20mg, which did not help. Patient denies headache, fever, chills, nausea, vomit, chest pain, shortness of breath, cough, lightheadedness, abdominal pain, diarrhea, constipation, dark/bloody stool, dysuria, hematuria, loss of strength, or loss of sensation. (24 Nov 2024 20:25)  Patient is a 69y old  Male who presents with a chief complaint of Bilateral leg swelling (03 Dec 2024 16:25)      INTERVAL HPI/OVERNIGHT EVENTS: no acute events wants o go home     MEDICATIONS  (STANDING):  ALPRAZolam 2 milliGRAM(s) Oral <User Schedule>  buprenorphine 8 mG/naloxone 2 mG SL Film 1 Film(s) SubLingual two times a day  furosemide    Tablet 20 milliGRAM(s) Oral daily  heparin   Injectable 5000 Unit(s) SubCutaneous every 8 hours  influenza  Vaccine (HIGH DOSE) 0.5 milliLiter(s) IntraMuscular once  melatonin 3 milliGRAM(s) Oral at bedtime    MEDICATIONS  (PRN):  acetaminophen     Tablet .. 650 milliGRAM(s) Oral every 6 hours PRN Temp greater or equal to 38C (100.4F), Mild Pain (1 - 3)      Allergies    No Known Allergies    Intolerances        REVIEW OF SYSTEMS:  CONSTITUTIONAL: No fever, weight loss, or fatigue  EYES: No eye pain, visual disturbances, or discharge  ENMT:  No difficulty hearing, tinnitus, vertigo; No sinus or throat pain  NECK: No pain or stiffness  BREASTS: No pain, masses, or nipple discharge  RESPIRATORY: No cough, wheezing, chills or hemoptysis; No shortness of breath  CARDIOVASCULAR: No chest pain, palpitations, dizziness, or leg swelling  GASTROINTESTINAL: No abdominal or epigastric pain. No nausea, vomiting, or hematemesis; No diarrhea or constipation. No melena or hematochezia.  GENITOURINARY: No dysuria, frequency, hematuria, or incontinence  NEUROLOGICAL: No headaches, memory loss, loss of strength, numbness, or tremors  SKIN: No itching, burning, rashes, or lesions   LYMPH NODES: No enlarged glands  ENDOCRINE: No heat or cold intolerance; No hair loss  MUSCULOSKELETAL: No joint pain or swelling; No muscle, back, or extremity pain  PSYCHIATRIC: No depression, anxiety, mood swings, or difficulty sleeping  HEME/LYMPH: No easy bruising, or bleeding gums  ALLERGY AND IMMUNOLOGIC: No hives or eczema    Vital Signs Last 24 Hrs  T(C): 36.6 (04 Dec 2024 11:49), Max: 36.8 (03 Dec 2024 17:39)  T(F): 97.9 (04 Dec 2024 11:49), Max: 98.2 (03 Dec 2024 17:39)  HR: 71 (04 Dec 2024 11:49) (71 - 85)  BP: 124/70 (04 Dec 2024 11:49) (124/70 - 160/78)  BP(mean): --  RR: 16 (04 Dec 2024 11:49) (14 - 17)  SpO2: 98% (04 Dec 2024 11:49) (93% - 98%)    Parameters below as of 04 Dec 2024 05:28  Patient On (Oxygen Delivery Method): room air        PHYSICAL EXAM:  GENERAL: NAD, well-groomed, well-developed  HEAD:  Atraumatic, Normocephalic  EYES: EOMI, PERRLA, conjunctiva and sclera clear  ENMT: No tonsillar erythema, exudates, or enlargement; Moist mucous membranes, Good dentition, No lesions  NECK: Supple, No JVD, Normal thyroid  NERVOUS SYSTEM:  Alert & Oriented X3, remains shakey   CHEST/LUNG: Clear to ascultation  bilaterally; No rales, rhonchi, wheezing, or rubs  HEART: Regular rate and rhythm; No murmurs, rubs, or gallops  ABDOMEN: Soft, Nontender, Nondistended; Bowel sounds present  EXTREMITIES:  2+ Peripheral Pulses, No clubbing, cyanosis, or edema  LYMPH: No lymphadenopathy noted  SKIN: No rashes or lesions    LABS:                        9.1    18.58 )-----------( 393      ( 04 Dec 2024 05:42 )             31.0     12-03    141  |  105  |  12  ----------------------------<  95  3.8   |  30  |  0.66    Ca    9.2      03 Dec 2024 07:08    TPro  6.4  /  Alb  x   /  TBili  x   /  DBili  x   /  AST  x   /  ALT  x   /  AlkPhos  x   12-04      Urinalysis Basic - ( 03 Dec 2024 07:08 )    Color: x / Appearance: x / SG: x / pH: x  Gluc: 95 mg/dL / Ketone: x  / Bili: x / Urobili: x   Blood: x / Protein: x / Nitrite: x   Leuk Esterase: x / RBC: x / WBC x   Sq Epi: x / Non Sq Epi: x / Bacteria: x      CAPILLARY BLOOD GLUCOSE          RADIOLOGY & ADDITIONAL TESTS:    Imaging Personally Reviewed:  [ ] YES  [ ] NO    Consultant(s) Notes Reviewed:  [ ] YES  [ ] NO    Care Discussed with Consultants/Other Providers [ ] YES  [ ] NO

## 2024-12-05 ENCOUNTER — TRANSCRIPTION ENCOUNTER (OUTPATIENT)
Age: 69
End: 2024-12-05

## 2024-12-05 VITALS
TEMPERATURE: 98 F | RESPIRATION RATE: 17 BRPM | SYSTOLIC BLOOD PRESSURE: 158 MMHG | DIASTOLIC BLOOD PRESSURE: 83 MMHG | HEART RATE: 81 BPM | OXYGEN SATURATION: 96 %

## 2024-12-05 LAB
% ALBUMIN: 51.6 % — SIGNIFICANT CHANGE UP
% ALPHA 1: 6.7 % — SIGNIFICANT CHANGE UP
% ALPHA 2: 12.4 % — SIGNIFICANT CHANGE UP
% BETA: 11.3 % — SIGNIFICANT CHANGE UP
% GAMMA: 18 % — SIGNIFICANT CHANGE UP
% M SPIKE: SIGNIFICANT CHANGE UP
ALBUMIN SERPL ELPH-MCNC: 3.3 G/DL — LOW (ref 3.6–5.5)
ALBUMIN SERPL ELPH-MCNC: 3.7 G/DL — SIGNIFICANT CHANGE UP (ref 3.3–5)
ALBUMIN/GLOB SERPL ELPH: 1.1 RATIO — SIGNIFICANT CHANGE UP
ALP SERPL-CCNC: 105 U/L — SIGNIFICANT CHANGE UP (ref 40–120)
ALPHA1 GLOB SERPL ELPH-MCNC: 0.4 G/DL — SIGNIFICANT CHANGE UP (ref 0.1–0.4)
ALPHA2 GLOB SERPL ELPH-MCNC: 0.8 G/DL — SIGNIFICANT CHANGE UP (ref 0.5–1)
ALT FLD-CCNC: 25 U/L — SIGNIFICANT CHANGE UP (ref 12–78)
ANION GAP SERPL CALC-SCNC: 5 MMOL/L — SIGNIFICANT CHANGE UP (ref 5–17)
AST SERPL-CCNC: 45 U/L — HIGH (ref 15–37)
B-GLOBULIN SERPL ELPH-MCNC: 0.7 G/DL — SIGNIFICANT CHANGE UP (ref 0.5–1)
BILIRUB SERPL-MCNC: 0.9 MG/DL — SIGNIFICANT CHANGE UP (ref 0.2–1.2)
BUN SERPL-MCNC: 14 MG/DL — SIGNIFICANT CHANGE UP (ref 7–23)
CALCIUM SERPL-MCNC: 9.5 MG/DL — SIGNIFICANT CHANGE UP (ref 8.5–10.1)
CHLORIDE SERPL-SCNC: 101 MMOL/L — SIGNIFICANT CHANGE UP (ref 96–108)
CO2 SERPL-SCNC: 32 MMOL/L — HIGH (ref 22–31)
CREAT SERPL-MCNC: 0.83 MG/DL — SIGNIFICANT CHANGE UP (ref 0.5–1.3)
EGFR: 96 ML/MIN/1.73M2 — SIGNIFICANT CHANGE UP
GAMMA GLOBULIN: 1.2 G/DL — SIGNIFICANT CHANGE UP (ref 0.6–1.6)
GLUCOSE SERPL-MCNC: 95 MG/DL — SIGNIFICANT CHANGE UP (ref 70–99)
HCT VFR BLD CALC: 34.9 % — LOW (ref 39–50)
HGB BLD-MCNC: 10.4 G/DL — LOW (ref 13–17)
INTERPRETATION SERPL IFE-IMP: SIGNIFICANT CHANGE UP
M-SPIKE: SIGNIFICANT CHANGE UP (ref 0–0)
MAGNESIUM SERPL-MCNC: 2.1 MG/DL — SIGNIFICANT CHANGE UP (ref 1.6–2.6)
MCHC RBC-ENTMCNC: 22.9 PG — LOW (ref 27–34)
MCHC RBC-ENTMCNC: 29.8 G/DL — LOW (ref 32–36)
MCV RBC AUTO: 76.9 FL — LOW (ref 80–100)
NRBC # BLD: 0 /100 WBCS — SIGNIFICANT CHANGE UP (ref 0–0)
PHOSPHATE SERPL-MCNC: 3.6 MG/DL — SIGNIFICANT CHANGE UP (ref 2.5–4.5)
PLATELET # BLD AUTO: 390 K/UL — SIGNIFICANT CHANGE UP (ref 150–400)
POTASSIUM SERPL-MCNC: 4.1 MMOL/L — SIGNIFICANT CHANGE UP (ref 3.5–5.3)
POTASSIUM SERPL-SCNC: 4.1 MMOL/L — SIGNIFICANT CHANGE UP (ref 3.5–5.3)
PROT PATTERN SERPL ELPH-IMP: SIGNIFICANT CHANGE UP
PROT SERPL-MCNC: 6.4 G/DL — SIGNIFICANT CHANGE UP (ref 6–8.3)
PROT SERPL-MCNC: 8 GM/DL — SIGNIFICANT CHANGE UP (ref 6–8.3)
RBC # BLD: 4.54 M/UL — SIGNIFICANT CHANGE UP (ref 4.2–5.8)
RBC # FLD: 23.3 % — HIGH (ref 10.3–14.5)
SODIUM SERPL-SCNC: 138 MMOL/L — SIGNIFICANT CHANGE UP (ref 135–145)
WBC # BLD: 20.31 K/UL — HIGH (ref 3.8–10.5)
WBC # FLD AUTO: 20.31 K/UL — HIGH (ref 3.8–10.5)

## 2024-12-05 PROCEDURE — 99239 HOSP IP/OBS DSCHRG MGMT >30: CPT

## 2024-12-05 PROCEDURE — 99231 SBSQ HOSP IP/OBS SF/LOW 25: CPT

## 2024-12-05 RX ORDER — BUPRENORPHINE AND NALOXONE 8; 2 MG/1; MG/1
1 TABLET SUBLINGUAL
Qty: 0 | Refills: 0 | DISCHARGE

## 2024-12-05 RX ORDER — FUROSEMIDE 40 MG/1
1 TABLET ORAL
Qty: 0 | Refills: 3 | DISCHARGE

## 2024-12-05 RX ADMIN — FUROSEMIDE 20 MILLIGRAM(S): 40 TABLET ORAL at 05:41

## 2024-12-05 RX ADMIN — BUPRENORPHINE AND NALOXONE 1 FILM(S): 8; 2 TABLET SUBLINGUAL at 05:42

## 2024-12-05 RX ADMIN — Medication 5000 UNIT(S): at 05:41

## 2024-12-05 RX ADMIN — Medication 2 MILLIGRAM(S): at 11:40

## 2024-12-05 RX ADMIN — Medication 2 MILLIGRAM(S): at 05:41

## 2024-12-05 NOTE — DISCHARGE NOTE PROVIDER - CARE PROVIDER_API CALL
Galdino Stephenson  Pulmonary Disease  [address] [place] [address]  Phone: [phone]  Fax: [phone]  Follow Up Time:     Magnolia Wilcox  Gastroenterology  [address] [place] [address]  Phone: [phone]  Fax: [phone]  Follow Up Time:

## 2024-12-05 NOTE — PROGRESS NOTE ADULT - ASSESSMENT
Patient is a 68 y/o man, with PMHx of L knee replacement, OCD, panic disorder, anxiety, opioid dependence-on Suboxone, and HTN, who comes in with a sudden onset of bilateral leg swelling that started 3 weeks ago. He woke up one day in the morning and noticed his legs were swollen. He went to his PCP Dr. Dr. Toney and was prescribed Lasix 20mg, which did not help. Patient denies headache, fever, chills, nausea, vomit, chest pain, shortness of breath, cough, lightheadedness, abdominal pain, diarrhea, constipation, dark/bloody stool, dysuria, hematuria, loss of strength, or loss of sensation. (24 Nov 2024 20:25)  ID consulted for workup and antibiotic management   Seen and examined at bedside. He is afebrile. Says no fever at home. Does not recall any triggering event, fall or trauma. Has pet cat that scratched his leg recently without any deep puncture wound. No recent antibiotics use. He has not had skin infections before. 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 He is not diabetic. He has no known allergies. Work up notable for leukocytosis, ESR: 29, CRP:47. US doppler without DVT. CT leg with soft tissue swelling and no fluid collection.    11/26: continues having fevers 102.3 today, RA, WBC increased 22.16, Cr elevated 1.66, BCs NGTD, MRSA PCR negative. Abx were changed by medicine to Clindamycin.  11/27: spiked a fever yesterday 102.3, no fevers today, RA, WBC with mild improvement 20.53, Cr normalized, BCs NGTD, two more sets were collected due to yesterday's fever, Clindamycin IV continued for now.    11/29: Afebrile today. T-max 100.6 last night. WBC trended down to 15k. CT A/P with splenomegaly and colitis of rectosigmoid and descending colon. Would stop clinda and start meropenem today.  12/2: Afebrile. No new fevers. Cellulitis and colitis seem to have improved. His WBC counts remain elevated. He has huge spleen on his CT on my review. Would have hem/onc eval. Will likely stop antibiotics soon.  12/3: no fever, RA, WBC still high but better 19.32, Cr ok, BCs NGTD x 4, MRSA PCR negative, Meropenem IV - day #5, will stop after today's doses.   12/5:     Impression:  1. Right LE cellulitis  2. Bilateral LE edema  3. Leukocytosis and fever spikes  4. Colitis  5. Splenomegaly  6. Hx of L knee replacement    Recommendations:  --Has completed course of antibiotics  --Would be okay to go off of antibiotics if gets discharged  --Hem/onc evaluation is appreciated   --ID will sign off. Please re-call if needed    Discussed with Dr. Clarence Cota MD  Attending Physician  Division of Infectious Diseases   Available via [employer] Teams   Patient is a 70 y/o man, with PMHx of L knee replacement, OCD, panic disorder, anxiety, opioid dependence-on Suboxone, and HTN, who comes in with a sudden onset of bilateral leg swelling that started 3 weeks ago. He woke up one day in the morning and noticed his legs were swollen. He went to his PCP Dr. Dr. Toney and was prescribed Lasix 20mg, which did not help. Patient denies headache, fever, chills, nausea, vomit, chest pain, shortness of breath, cough, lightheadedness, abdominal pain, diarrhea, constipation, dark/bloody stool, dysuria, hematuria, loss of strength, or loss of sensation. (24 Nov 2024 20:25)  ID consulted for workup and antibiotic management   Seen and examined at bedside. He is afebrile. Says no fever at home. Does not recall any triggering event, fall or trauma. Has pet cat that scratched his leg recently without any deep puncture wound. No recent antibiotics use. He has not had skin infections before. 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 He has no known allergies. Work up notable for leukocytosis, ESR: 29, CRP:47. US doppler without DVT. CT leg with soft tissue swelling and no fluid collection.    11/26: continues having fevers 102.3 today, RA, WBC increased 22.16, Cr elevated 1.66, BCs NGTD, MRSA PCR negative. Abx were changed by medicine to Clindamycin.  11/27: spiked a fever yesterday 102.3, no fevers today, RA, WBC with mild improvement 20.53, Cr normalized, BCs NGTD, two more sets were collected due to yesterday's fever, Clindamycin IV continued for now.    11/29: Afebrile today. T-max 100.6 last night. WBC trended down to 15k. CT A/P with splenomegaly and colitis of rectosigmoid and descending colon. Would stop clinda and start meropenem today.  12/2: Afebrile. No new fevers. Cellulitis and colitis seem to have improved. His WBC counts remain elevated. He has huge spleen on his CT on my review. Would have hem/onc eval. Will likely stop antibiotics soon.  12/3: no fever, RA, WBC still high but better 19.32, Cr ok, BCs NGTD x 4, MRSA PCR negative, Meropenem IV - day #5, will stop after today's doses.   12/5: Completed course of antibiotics. Hem/Onc eval was done. He plans to get colonscopy after discharge. He will be discharged off of antibiotics    Impression:  1. Right LE cellulitis  2. Bilateral LE edema  3. Leukocytosis and fever spikes  4. Colitis  5. Splenomegaly  6. Hx of L knee replacement    Recommendations:  --Has completed course of antibiotics  --Would be okay to go off of antibiotics if gets discharged  --Hem/onc evaluation is appreciated   --ID will sign off. Please re-call if needed    Discussed with Dr. Clarence Cota MD  Attending Physician  Division of Infectious Diseases   Available via Microsoft Teams

## 2024-12-05 NOTE — DISCHARGE NOTE PROVIDER - NSDCCPCAREPLAN_GEN_ALL_CORE_FT
PRINCIPAL DISCHARGE DIAGNOSIS  Diagnosis: Bilateral cellulitis of lower leg  Assessment and Plan of Treatment:       SECONDARY DISCHARGE DIAGNOSES  Diagnosis: Anemia  Assessment and Plan of Treatment:     Diagnosis: OCD (obsessive compulsive disorder)  Assessment and Plan of Treatment:     Diagnosis: Narcotic dependence  Assessment and Plan of Treatment:

## 2024-12-05 NOTE — DISCHARGE NOTE PROVIDER - NSDCFUADDAPPT_GEN_ALL_CORE_FT
APPTS ARE READY TO BE MADE: [X ] YES    Best Family or Patient Contact (if needed):    Additional Information about above appointments (if needed):    1: Hematology   2:   3:     Other comments or requests:    APPTS ARE READY TO BE MADE: [X ] YES    Best Family or Patient Contact (if needed):    Additional Information about above appointments (if needed):    1: Hematology   2:   3:     Other comments or requests:       Patient informed us they already have secured a follow up appointment which was not scheduled by our team. APPTS ARE READY TO BE MADE: [X ] YES    Best Family or Patient Contact (if needed):    Additional Information about above appointments (if needed):    1: Hematology   2:   3:     Other comments or requests:       12/20-Spoke with patients' mg Ivey, stated both Dr. CACERES/Pulmonary are on hold for now, since PCP  referred patient to a Vascular first    12/13-16-Patient was outreached but did not answer. A voicemail was left for the patient to return our call.      12/[id number]- PCP-Patient informed us they already have secured a follow up appointment which was not scheduled by our team 12/20/24 1 p.m. with Dr. Israel Zelaya [address] [place] [address] [place] [address]  Patient informed us they already have secured a follow up appointment which was not scheduled by our team. APPTS ARE READY TO BE MADE: [X ] YES    Best Family or Patient Contact (if needed):    Additional Information about above appointments (if needed):    1: Hematology   2:   3:     Other comments or requests:     12/20-Spoke with patients' mg Ivey, stated both Dr. CACERES/Pulmonary are on hold for now, since PCP  referred patient to a Vascular first    12/13-16-Patient was outreached but did not answer. A voicemail was left for the patient to return our call.      12/[id number]- PCP-Patient informed us they already have secured a follow up appointment which was not scheduled by our team 12/20/24 1 p.m. with Dr. Israel Zelaya [address] [place] [address] Dr Kristian SMITH [phone]      12/20-Spoke with patients' mg Ivey, stated both Dr. CACERES/Pulmonary are on hold for now, since PCP  referred patient to a Vascular first    12/13-16-Patient was outreached but did not answer. A voicemail was left for the patient to return our call.      12/[id number]- PCP-Patient informed us they already have secured a follow up appointment which was not scheduled by our team 12/20/24 1 p.m. with Dr. Israel Zelaya [address] [place] [address] Dr Kristian SMITH [id number]  Patient informed us they already have secured a follow up appointment which was not scheduled by our team.

## 2024-12-05 NOTE — DISCHARGE NOTE PROVIDER - NSDCMRMEDTOKEN_GEN_ALL_CORE_FT
ALPRAZolam 2 mg oral tablet: 1 orally 3 times a day  BUPRENORPHINE-NALOX 8-2MG FILM: 1 film(s) sublingual 2 times a day ONE FILM UNDER TONGUE TWICE A DAY  FUROSEMIDE 20 MG TABLET: 1 tab(s) orally once a day TAKE 1 TABLET BY MOUTH EVERY DAY  lisinopril 5 mg oral tablet: 1 orally once a day

## 2024-12-05 NOTE — DISCHARGE NOTE PROVIDER - NSDCFUSCHEDAPPT_GEN_ALL_CORE_FT
Garcia Wood  [facility]  [facility] [address] [facility] [address]  Scheduled Appointment: 12/20/2024

## 2024-12-05 NOTE — PROGRESS NOTE ADULT - PROVIDER SPECIALTY LIST ADULT
Hospitalist
Infectious Disease
Infectious Disease
Internal Medicine
Hospitalist
Infectious Disease
Hospitalist
Infectious Disease
Internal Medicine
Hospitalist

## 2024-12-05 NOTE — DISCHARGE NOTE NURSING/CASE MANAGEMENT/SOCIAL WORK - NSDCPEFALRISK_GEN_ALL_CORE
For information on Fall & Injury Prevention, visit: [email or web address] OR  [email or web address] OR  https://www.cdc.gov/steadi/patient.html

## 2024-12-05 NOTE — DISCHARGE NOTE NURSING/CASE MANAGEMENT/SOCIAL WORK - FINANCIAL ASSISTANCE
[facility] provides services at a reduced cost to those who are determined to be eligible through [facility]’s financial assistance program. Information regarding [facility]’s financial assistance program can be found by going to [email or web address] or by calling [phone].

## 2024-12-05 NOTE — DISCHARGE NOTE PROVIDER - HOSPITAL COURSE
HPI:  Patient is a 69M, with PMHx of L knee replacement, OCD, Panic disorder, Anxiety, Opioid dependence on suboxone (from oxycodone use from back surgery, expected to finish long suboxone taper in 1 year), HTN, who comes in with a sudden onset of bilateral leg swelling that started 3 weeks ago. He woke up one day in the morning and noticed his legs were swollen. He went to his PCP Dr. Dr. Toney and was prescribed Lasix 20mg, which did not help. Patient denies headache, fever, chills, nausea, vomit, chest pain, shortness of breath, cough, lightheadedness, abdominal pain, diarrhea, constipation, dark/bloody stool, dysuria, hematuria, loss of strength, or loss of sensation. (24 Nov 2024 20:25)    69M, with MHx of L knee replacement, OCD, Panic disorder, Anxiety, Opioid dependence on suboxone (from oxycodone use from back surgery, expected to finish long suboxone taper in 1 year), HTN, who comes in with a sudden onset of bilateral leg swelling that started 3 weeks ago. Admitted for cellulitis +/- colitis    ##Leg swelling   ##Colitis   P/w 1 month of BL leg swelling. LLE swelling is improved. RLE still swollen.   TTE reviewed EF 69%; H2FPEF 49%. BNP 2423. CTAP with colitis   switch to PO lasix  Abx changed to IV ewa  BCx NGTD  c/w ID, plan for 5 days total of ewa completed      #Splenomegaly  Given splenomegaly and leukocytosis, heme onc consult appreciated  wants to follow up with GI as outpatient ha never had a colonoscopy     #Narcotic dependence   Home med Suboxone (due to oxycodone use from back surgery, expected to finish long suboxone taper in 1 year)  C/w Suboxone.    #Panic disorder  #OCD   #anxiety   Home med Xanax 2mg TID. Patient says he gets very bad panic attacks so he will not taper off of Xanax  - C/w home med.    #Anemia.   Microcytic anemia  P/w Hgb 9.5 (it was 10.9 in May 2023)  Possibly iron deficiency anemia  No sign of bleeding  Outpt follow up.    #HTN (hypertension).   hold for now lisinopril 5mg.    Diet: DASH  DVT prophylaxis: SQH  Dispo: pending heme/onc recs and completion of Ewa (last dose 12/3 evening)  Code Status: FC

## 2024-12-05 NOTE — DISCHARGE NOTE NURSING/CASE MANAGEMENT/SOCIAL WORK - PATIENT PORTAL LINK FT
You can access the FollowMyHealth Patient Portal offered by [facility] by registering at the following website: [email or web address]. By joining [employer]’s FollowMyHealth portal, you will also be able to view your health information using other applications (apps) compatible with our system.

## 2024-12-05 NOTE — PROGRESS NOTE ADULT - REASON FOR ADMISSION
Bilateral leg swelling

## 2024-12-05 NOTE — PROGRESS NOTE ADULT - SUBJECTIVE AND OBJECTIVE BOX
JUAQUIN JOHNSON  MRN-[id number]  69y (1955)    Follow Up:  Leukocytosis, colitis, s/p cellulitis     Interval History:     ROS:    [ ] Unobtainable because:  [ x] All other systems negative    Constitutional: no fever, no chills  Head: no trauma  Eyes: no vision changes, no eye pain  ENT:  no sore throat, no rhinorrhea  Cardiovascular:  no chest pain, no palpitation  Respiratory:  no SOB, no cough  GI:  no abd pain, no vomiting, no diarrhea  urinary: no dysuria, no hematuria, no flank pain  musculoskeletal:  no joint pain, no joint swelling  skin:  no rash  neurology:  no headache, no seizure, no change in mental status  psych: no anxiety, no depression     Allergies  No Known Allergies    ANTIMICROBIALS:  meropenem  IVPB 1000 every 8 hours    OTHER MEDS:  acetaminophen     Tablet .. 650 milliGRAM(s) Oral every 6 hours PRN  ALPRAZolam 2 milliGRAM(s) Oral <User Schedule>  buprenorphine 8 mG/naloxone 2 mG SL Film 1 Film(s) SubLingual two times a day  furosemide    Tablet 20 milliGRAM(s) Oral daily  heparin   Injectable 5000 Unit(s) SubCutaneous every 8 hours  influenza  Vaccine (HIGH DOSE) 0.5 milliLiter(s) IntraMuscular once  iohexol 300 mG (iodine)/mL Oral Solution 30 milliLiter(s) Oral once  melatonin 3 milliGRAM(s) Oral at bedtime      Physical Exam:  Vital Signs Last 24 Hrs  T(C): 36.5 (05 Dec 2024 05:27), Max: 36.8 (04 Dec 2024 17:10)  T(F): 97.7 (05 Dec 2024 05:27), Max: 98.2 (04 Dec 2024 17:10)  HR: 73 (05 Dec 2024 05:27) (67 - 94)  BP: 155/85 (05 Dec 2024 05:27) (116/63 - 155/85)  BP(mean): --  RR: 18 (05 Dec 2024 05:27) (16 - 18)  SpO2: 94% (05 Dec 2024 05:27) (94% - 98%)    Parameters below as of 04 Dec 2024 17:10  Patient On (Oxygen Delivery Method): room air    Constitutional: Pleasant man, non-toxic, no distress  HEAD/EYES: anicteric, no conjunctival injection  ENT:  supple, no thrush  Cardiovascular:   normal S1, S2, no murmur   Respiratory:  clear BS bilaterally, no wheezes, no rales  GI:  soft, mild left sided tenderness, normal bowel sounds, not distended   :  no corley, no CVA tenderness  Musculoskeletal:  Bilateral LE erythema and swelling significantly improved   Neurologic: awake and alert, normal strength, no focal findings  Skin:  Bilateral onychomycosis  Heme/Onc: no lymphadenopathy   Psychiatric:  awake, alert, appropriate mood    WBC Count: 20.31 K/uL (12-05 @ 07:50)  WBC Count: 18.58 K/uL (12-04 @ 05:42)  WBC Count: 19.37 K/uL (12-03 @ 07:08)  WBC Count: 21.05 K/uL (12-02 @ 06:45)  WBC Count: 17.02 K/uL (12-01 @ 06:17)  WBC Count: 14.87 K/uL (11-30 @ 05:25)  WBC Count: 15.07 K/uL (11-29 @ 06:59)                          10.4   20.31 )-----------( 390      ( 05 Dec 2024 07:50 )             34.9     12-05    138  |  101  |  14  ----------------------------<  95  4.1   |  32[H]  |  0.83    Ca    9.5      05 Dec 2024 07:50  Phos  3.6     12-05  Mg     2.1     12-05    TPro  8.0  /  Alb  3.7  /  TBili  0.9  /  DBili  x   /  AST  45[H]  /  ALT  25  /  AlkPhos  105  12-05    Urinalysis Basic - ( 03 Dec 2024 07:08 )    Color: x / Appearance: x / SG: x / pH: x  Gluc: 95 mg/dL / Ketone: x  / Bili: x / Urobili: x   Blood: x / Protein: x / Nitrite: x   Leuk Esterase: x / RBC: x / WBC x   Sq Epi: x / Non Sq Epi: x / Bacteria: x      Creatinine Trend: 0.66<--, 0.87<--, 0.77<--, 0.75<--, 0.82<--, 1.05<--    MICROBIOLOGY:  .Blood BLOOD  11-26-24   No growth at 5 days  --  --      .Blood BLOOD  11-26-24   No growth at 5 days  --  --      .Blood BLOOD  11-24-24   No growth at 5 days  --  --      .Blood BLOOD  11-24-24   No growth at 5 days  --  --      C-Reactive Protein: 47 (11-24)      RADIOLOGY:     JUAQUIN JOHNSON  MRN-[id number]  69y (1955)    Follow Up:  Leukocytosis, colitis, s/p cellulitis     Interval History: He is afebrile. Comfortable on RA. Eager to go back home.     ROS:    [ ] Unobtainable because:  [ x] All other systems negative    Constitutional: no fever, no chills  Head: no trauma  Eyes: no vision changes, no eye pain  ENT:  no sore throat, no rhinorrhea  Cardiovascular:  no chest pain, no palpitation  Respiratory:  no SOB, no cough  GI:  no abd pain, no vomiting, no diarrhea  urinary: no dysuria, no hematuria, no flank pain  musculoskeletal:  no joint pain, no joint swelling  skin:  no rash  neurology:  no headache, no seizure, no change in mental status  psych: no anxiety, no depression     Allergies  No Known Allergies    ANTIMICROBIALS:  meropenem  IVPB 1000 every 8 hours    OTHER MEDS:  acetaminophen     Tablet .. 650 milliGRAM(s) Oral every 6 hours PRN  ALPRAZolam 2 milliGRAM(s) Oral <User Schedule>  buprenorphine 8 mG/naloxone 2 mG SL Film 1 Film(s) SubLingual two times a day  furosemide    Tablet 20 milliGRAM(s) Oral daily  heparin   Injectable 5000 Unit(s) SubCutaneous every 8 hours  influenza  Vaccine (HIGH DOSE) 0.5 milliLiter(s) IntraMuscular once  iohexol 300 mG (iodine)/mL Oral Solution 30 milliLiter(s) Oral once  melatonin 3 milliGRAM(s) Oral at bedtime      Physical Exam:  Vital Signs Last 24 Hrs  T(C): 36.5 (05 Dec 2024 05:27), Max: 36.8 (04 Dec 2024 17:10)  T(F): 97.7 (05 Dec 2024 05:27), Max: 98.2 (04 Dec 2024 17:10)  HR: 73 (05 Dec 2024 05:27) (67 - 94)  BP: 155/85 (05 Dec 2024 05:27) (116/63 - 155/85)  BP(mean): --  RR: 18 (05 Dec 2024 05:27) (16 - 18)  SpO2: 94% (05 Dec 2024 05:27) (94% - 98%)    Parameters below as of 04 Dec 2024 17:10  Patient On (Oxygen Delivery Method): room air    Constitutional: Pleasant man, non-toxic, no distress  HEAD/EYES: anicteric, no conjunctival injection  ENT:  supple, no thrush  Cardiovascular:   normal S1, S2, no murmur   Respiratory:  clear BS bilaterally, no wheezes, no rales  GI:  soft, mild left sided tenderness, normal bowel sounds, not distended   :  no corley, no CVA tenderness  Musculoskeletal:  Bilateral LE erythema and swelling significantly improved   Neurologic: awake and alert, normal strength, no focal findings  Skin:  Bilateral onychomycosis  Heme/Onc: no lymphadenopathy   Psychiatric:  awake, alert, appropriate mood    WBC Count: 20.31 K/uL (12-05 @ 07:50)  WBC Count: 18.58 K/uL (12-04 @ 05:42)  WBC Count: 19.37 K/uL (12-03 @ 07:08)  WBC Count: 21.05 K/uL (12-02 @ 06:45)  WBC Count: 17.02 K/uL (12-01 @ 06:17)  WBC Count: 14.87 K/uL (11-30 @ 05:25)  WBC Count: 15.07 K/uL (11-29 @ 06:59)                          10.4   20.31 )-----------( 390      ( 05 Dec 2024 07:50 )             34.9     12-05    138  |  101  |  14  ----------------------------<  95  4.1   |  32[H]  |  0.83    Ca    9.5      05 Dec 2024 07:50  Phos  3.6     12-05  Mg     2.1     12-05    TPro  8.0  /  Alb  3.7  /  TBili  0.9  /  DBili  x   /  AST  45[H]  /  ALT  25  /  AlkPhos  105  12-05    Urinalysis Basic - ( 03 Dec 2024 07:08 )    Color: x / Appearance: x / SG: x / pH: x  Gluc: 95 mg/dL / Ketone: x  / Bili: x / Urobili: x   Blood: x / Protein: x / Nitrite: x   Leuk Esterase: x / RBC: x / WBC x   Sq Epi: x / Non Sq Epi: x / Bacteria: x      Creatinine Trend: 0.66<--, 0.87<--, 0.77<--, 0.75<--, 0.82<--, 1.05<--    MICROBIOLOGY:  .Blood BLOOD  11-26-24   No growth at 5 days  --  --      .Blood BLOOD  11-26-24   No growth at 5 days  --  --      .Blood BLOOD  11-24-24   No growth at 5 days  --  --      .Blood BLOOD  11-24-24   No growth at 5 days  --  --      C-Reactive Protein: 47 (11-24)      RADIOLOGY:

## 2024-12-11 DIAGNOSIS — K52.9 NONINFECTIVE GASTROENTERITIS AND COLITIS, UNSPECIFIED: ICD-10-CM

## 2024-12-11 DIAGNOSIS — D72.829 ELEVATED WHITE BLOOD CELL COUNT, UNSPECIFIED: ICD-10-CM

## 2024-12-11 DIAGNOSIS — Z96.652 PRESENCE OF LEFT ARTIFICIAL KNEE JOINT: ICD-10-CM

## 2024-12-11 DIAGNOSIS — F42.9 OBSESSIVE-COMPULSIVE DISORDER, UNSPECIFIED: ICD-10-CM

## 2024-12-11 DIAGNOSIS — N14.11 CONTRAST-INDUCED NEPHROPATHY: ICD-10-CM

## 2024-12-11 DIAGNOSIS — F41.0 PANIC DISORDER [EPISODIC PAROXYSMAL ANXIETY]: ICD-10-CM

## 2024-12-11 DIAGNOSIS — D50.9 IRON DEFICIENCY ANEMIA, UNSPECIFIED: ICD-10-CM

## 2024-12-11 DIAGNOSIS — R16.1 SPLENOMEGALY, NOT ELSEWHERE CLASSIFIED: ICD-10-CM

## 2024-12-11 DIAGNOSIS — T50.8X5A ADVERSE EFFECT OF DIAGNOSTIC AGENTS, INITIAL ENCOUNTER: ICD-10-CM

## 2024-12-11 DIAGNOSIS — F11.20 OPIOID DEPENDENCE, UNCOMPLICATED: ICD-10-CM

## 2024-12-11 DIAGNOSIS — I10 ESSENTIAL (PRIMARY) HYPERTENSION: ICD-10-CM

## 2024-12-11 DIAGNOSIS — L03.115 CELLULITIS OF RIGHT LOWER LIMB: ICD-10-CM

## 2024-12-11 DIAGNOSIS — B35.1 TINEA UNGUIUM: ICD-10-CM

## 2024-12-11 DIAGNOSIS — F17.290 NICOTINE DEPENDENCE, OTHER TOBACCO PRODUCT, UNCOMPLICATED: ICD-10-CM

## 2024-12-11 DIAGNOSIS — L03.116 CELLULITIS OF LEFT LOWER LIMB: ICD-10-CM

## 2024-12-11 DIAGNOSIS — N17.9 ACUTE KIDNEY FAILURE, UNSPECIFIED: ICD-10-CM

## 2024-12-11 DIAGNOSIS — B95.62 METHICILLIN RESISTANT STAPHYLOCOCCUS AUREUS INFECTION AS THE CAUSE OF DISEASES CLASSIFIED ELSEWHERE: ICD-10-CM

## 2024-12-11 DIAGNOSIS — R60.0 LOCALIZED EDEMA: ICD-10-CM

## 2024-12-20 ENCOUNTER — APPOINTMENT (OUTPATIENT)
Dept: INTERNAL MEDICINE | Facility: CLINIC | Age: 69
End: 2024-12-20
Payer: SELF-PAY

## 2024-12-20 PROCEDURE — 99499D: CUSTOM

## 2025-01-17 ENCOUNTER — APPOINTMENT (OUTPATIENT)
Dept: INTERNAL MEDICINE | Facility: CLINIC | Age: 70
End: 2025-01-17
Payer: SELF-PAY

## 2025-01-17 PROCEDURE — 99499D: CUSTOM

## 2025-01-17 RX ORDER — BUPRENORPHINE AND NALOXONE 8; 2 MG/1; MG/1
8-2 TABLET SUBLINGUAL
Qty: 60 | Refills: 0 | Status: ACTIVE | COMMUNITY
Start: 2025-01-17 | End: 1900-01-01

## 2025-02-14 ENCOUNTER — APPOINTMENT (OUTPATIENT)
Dept: INTERNAL MEDICINE | Facility: CLINIC | Age: 70
End: 2025-02-14
Payer: SELF-PAY

## 2025-02-14 PROCEDURE — 99499D: CUSTOM

## 2025-03-04 ENCOUNTER — APPOINTMENT (OUTPATIENT)
Dept: COLORECTAL SURGERY | Facility: CLINIC | Age: 70
End: 2025-03-04
Payer: MEDICARE

## 2025-03-04 VITALS
DIASTOLIC BLOOD PRESSURE: 76 MMHG | SYSTOLIC BLOOD PRESSURE: 121 MMHG | HEIGHT: 69 IN | BODY MASS INDEX: 27.25 KG/M2 | HEART RATE: 52 BPM | RESPIRATION RATE: 16 BRPM | WEIGHT: 184 LBS | TEMPERATURE: 98 F | OXYGEN SATURATION: 98 %

## 2025-03-04 DIAGNOSIS — K40.90 UNILATERAL INGUINAL HERNIA, W/OUT OBSTRUCTION OR GANGRENE, NOT SPECIFIED AS RECURRENT: ICD-10-CM

## 2025-03-04 DIAGNOSIS — K52.9 NONINFECTIVE GASTROENTERITIS AND COLITIS, UNSPECIFIED: ICD-10-CM

## 2025-03-04 PROCEDURE — 99202 OFFICE O/P NEW SF 15 MIN: CPT

## 2025-03-04 RX ORDER — FUROSEMIDE 80 MG/1
TABLET ORAL
Refills: 0 | Status: ACTIVE | COMMUNITY

## 2025-03-04 RX ORDER — LISINOPRIL 5 MG/1
5 TABLET ORAL
Refills: 0 | Status: ACTIVE | COMMUNITY

## 2025-03-13 ENCOUNTER — APPOINTMENT (OUTPATIENT)
Dept: INTERNAL MEDICINE | Facility: CLINIC | Age: 70
End: 2025-03-13
Payer: SELF-PAY

## 2025-03-13 PROCEDURE — 99499D: CUSTOM

## 2025-03-20 ENCOUNTER — APPOINTMENT (OUTPATIENT)
Dept: COLORECTAL SURGERY | Facility: CLINIC | Age: 70
End: 2025-03-20
Payer: MEDICARE

## 2025-03-20 DIAGNOSIS — K52.9 NONINFECTIVE GASTROENTERITIS AND COLITIS, UNSPECIFIED: ICD-10-CM

## 2025-03-20 DIAGNOSIS — K64.9 UNSPECIFIED HEMORRHOIDS: ICD-10-CM

## 2025-03-20 PROCEDURE — 45380 COLONOSCOPY AND BIOPSY: CPT

## 2025-03-25 LAB — CORE LAB BIOPSY: NORMAL

## 2025-04-10 ENCOUNTER — APPOINTMENT (OUTPATIENT)
Dept: INTERNAL MEDICINE | Facility: CLINIC | Age: 70
End: 2025-04-10
Payer: SELF-PAY

## 2025-04-10 PROCEDURE — 99499D: CUSTOM

## 2025-04-22 NOTE — H&P ADULT - PROBLEM SELECTOR PROBLEM 1
Dr. Green office- you can just cancel out the referral? Patients neurologist does botox for migraines so we do not need the referral. Thank you!    Leg swelling

## 2025-05-06 ENCOUNTER — APPOINTMENT (OUTPATIENT)
Dept: INTERNAL MEDICINE | Facility: CLINIC | Age: 70
End: 2025-05-06
Payer: SELF-PAY

## 2025-05-06 PROCEDURE — 99499D: CUSTOM

## 2025-06-05 ENCOUNTER — APPOINTMENT (OUTPATIENT)
Dept: INTERNAL MEDICINE | Facility: CLINIC | Age: 70
End: 2025-06-05
Payer: SELF-PAY

## 2025-06-05 PROCEDURE — 99499D: CUSTOM

## 2025-07-03 ENCOUNTER — APPOINTMENT (OUTPATIENT)
Dept: INTERNAL MEDICINE | Facility: CLINIC | Age: 70
End: 2025-07-03
Payer: SELF-PAY

## 2025-07-03 PROCEDURE — 99499D: CUSTOM

## 2025-08-05 ENCOUNTER — APPOINTMENT (OUTPATIENT)
Dept: INTERNAL MEDICINE | Facility: CLINIC | Age: 70
End: 2025-08-05
Payer: SELF-PAY

## 2025-08-05 PROCEDURE — 99499D: CUSTOM

## 2025-09-02 ENCOUNTER — APPOINTMENT (OUTPATIENT)
Dept: INTERNAL MEDICINE | Facility: CLINIC | Age: 70
End: 2025-09-02
Payer: SELF-PAY

## 2025-09-02 PROCEDURE — 99499D: CUSTOM

## 2025-09-04 ENCOUNTER — RX RENEWAL (OUTPATIENT)
Age: 70
End: 2025-09-04

## 2025-09-04 RX ORDER — BUPRENORPHINE AND NALOXONE 8; 2 MG/1; MG/1
8-2 FILM BUCCAL; SUBLINGUAL
Qty: 60 | Refills: 0 | Status: ACTIVE | COMMUNITY
Start: 2025-09-04 | End: 1900-01-01

## (undated) DEVICE — GLV 7 PROTEXIS (WHITE)

## (undated) DEVICE — XI DRAPE COLUMN

## (undated) DEVICE — D HELP - CLEARVIEW CLEARIFY SYSTEM

## (undated) DEVICE — XI TIP COVER

## (undated) DEVICE — GOWN IMPERV BREATHABLE XL

## (undated) DEVICE — PACK GENERAL LAPAROSCOPY

## (undated) DEVICE — XI SEAL UNIV 5- 8 MM

## (undated) DEVICE — SUT VLOC 180 2-0 6" GS-22 GREEN

## (undated) DEVICE — TUBING STRYKER PNEUMOCLEAR SMOKE EVACUATION HIGH FLOW

## (undated) DEVICE — NDL HYPO SAFE 22G X 1.5" (BLACK)

## (undated) DEVICE — XI DRAPE ARM

## (undated) DEVICE — GLV 7.5 PROTEXIS (WHITE)

## (undated) DEVICE — XI OBTURATOR OPTICAL BLADELESS 8MM